# Patient Record
Sex: FEMALE | Race: WHITE | HISPANIC OR LATINO | URBAN - METROPOLITAN AREA
[De-identification: names, ages, dates, MRNs, and addresses within clinical notes are randomized per-mention and may not be internally consistent; named-entity substitution may affect disease eponyms.]

---

## 2021-10-09 ENCOUNTER — EMERGENCY (EMERGENCY)
Facility: HOSPITAL | Age: 71
LOS: 0 days | Discharge: HOME | End: 2021-10-10
Attending: STUDENT IN AN ORGANIZED HEALTH CARE EDUCATION/TRAINING PROGRAM | Admitting: STUDENT IN AN ORGANIZED HEALTH CARE EDUCATION/TRAINING PROGRAM
Payer: MEDICARE

## 2021-10-09 VITALS
OXYGEN SATURATION: 99 % | HEART RATE: 104 BPM | SYSTOLIC BLOOD PRESSURE: 171 MMHG | DIASTOLIC BLOOD PRESSURE: 82 MMHG | RESPIRATION RATE: 17 BRPM | TEMPERATURE: 99 F

## 2021-10-09 DIAGNOSIS — I10 ESSENTIAL (PRIMARY) HYPERTENSION: ICD-10-CM

## 2021-10-09 DIAGNOSIS — Z87.442 PERSONAL HISTORY OF URINARY CALCULI: ICD-10-CM

## 2021-10-09 DIAGNOSIS — R31.9 HEMATURIA, UNSPECIFIED: ICD-10-CM

## 2021-10-09 DIAGNOSIS — R10.30 LOWER ABDOMINAL PAIN, UNSPECIFIED: ICD-10-CM

## 2021-10-09 DIAGNOSIS — F17.200 NICOTINE DEPENDENCE, UNSPECIFIED, UNCOMPLICATED: ICD-10-CM

## 2021-10-09 LAB
ALBUMIN SERPL ELPH-MCNC: 4.5 G/DL — SIGNIFICANT CHANGE UP (ref 3.5–5.2)
ALP SERPL-CCNC: 106 U/L — SIGNIFICANT CHANGE UP (ref 30–115)
ALT FLD-CCNC: 18 U/L — SIGNIFICANT CHANGE UP (ref 0–41)
ANION GAP SERPL CALC-SCNC: 11 MMOL/L — SIGNIFICANT CHANGE UP (ref 7–14)
APPEARANCE UR: ABNORMAL
AST SERPL-CCNC: 21 U/L — SIGNIFICANT CHANGE UP (ref 0–41)
BACTERIA # UR AUTO: NEGATIVE — SIGNIFICANT CHANGE UP
BASOPHILS # BLD AUTO: 0.03 K/UL — SIGNIFICANT CHANGE UP (ref 0–0.2)
BASOPHILS NFR BLD AUTO: 0.2 % — SIGNIFICANT CHANGE UP (ref 0–1)
BILIRUB SERPL-MCNC: <0.2 MG/DL — SIGNIFICANT CHANGE UP (ref 0.2–1.2)
BILIRUB UR-MCNC: NEGATIVE — SIGNIFICANT CHANGE UP
BUN SERPL-MCNC: 13 MG/DL — SIGNIFICANT CHANGE UP (ref 10–20)
CALCIUM SERPL-MCNC: 9.7 MG/DL — SIGNIFICANT CHANGE UP (ref 8.5–10.1)
CHLORIDE SERPL-SCNC: 102 MMOL/L — SIGNIFICANT CHANGE UP (ref 98–110)
CO2 SERPL-SCNC: 28 MMOL/L — SIGNIFICANT CHANGE UP (ref 17–32)
COLOR SPEC: ABNORMAL
CREAT SERPL-MCNC: 0.6 MG/DL — LOW (ref 0.7–1.5)
DIFF PNL FLD: ABNORMAL
EOSINOPHIL # BLD AUTO: 0.27 K/UL — SIGNIFICANT CHANGE UP (ref 0–0.7)
EOSINOPHIL NFR BLD AUTO: 2.1 % — SIGNIFICANT CHANGE UP (ref 0–8)
EPI CELLS # UR: 4 /HPF — SIGNIFICANT CHANGE UP (ref 0–5)
GLUCOSE SERPL-MCNC: 115 MG/DL — HIGH (ref 70–99)
GLUCOSE UR QL: NEGATIVE — SIGNIFICANT CHANGE UP
HCT VFR BLD CALC: 42.3 % — SIGNIFICANT CHANGE UP (ref 37–47)
HGB BLD-MCNC: 14.3 G/DL — SIGNIFICANT CHANGE UP (ref 12–16)
HYALINE CASTS # UR AUTO: 1 /LPF — SIGNIFICANT CHANGE UP (ref 0–7)
IMM GRANULOCYTES NFR BLD AUTO: 0.5 % — HIGH (ref 0.1–0.3)
KETONES UR-MCNC: NEGATIVE — SIGNIFICANT CHANGE UP
LEUKOCYTE ESTERASE UR-ACNC: NEGATIVE — SIGNIFICANT CHANGE UP
LYMPHOCYTES # BLD AUTO: 3.94 K/UL — HIGH (ref 1.2–3.4)
LYMPHOCYTES # BLD AUTO: 30.8 % — SIGNIFICANT CHANGE UP (ref 20.5–51.1)
MCHC RBC-ENTMCNC: 30.2 PG — SIGNIFICANT CHANGE UP (ref 27–31)
MCHC RBC-ENTMCNC: 33.8 G/DL — SIGNIFICANT CHANGE UP (ref 32–37)
MCV RBC AUTO: 89.4 FL — SIGNIFICANT CHANGE UP (ref 81–99)
MONOCYTES # BLD AUTO: 0.91 K/UL — HIGH (ref 0.1–0.6)
MONOCYTES NFR BLD AUTO: 7.1 % — SIGNIFICANT CHANGE UP (ref 1.7–9.3)
NEUTROPHILS # BLD AUTO: 7.59 K/UL — HIGH (ref 1.4–6.5)
NEUTROPHILS NFR BLD AUTO: 59.3 % — SIGNIFICANT CHANGE UP (ref 42.2–75.2)
NITRITE UR-MCNC: NEGATIVE — SIGNIFICANT CHANGE UP
NRBC # BLD: 0 /100 WBCS — SIGNIFICANT CHANGE UP (ref 0–0)
PH UR: 7 — SIGNIFICANT CHANGE UP (ref 5–8)
PLATELET # BLD AUTO: 236 K/UL — SIGNIFICANT CHANGE UP (ref 130–400)
POTASSIUM SERPL-MCNC: 4.7 MMOL/L — SIGNIFICANT CHANGE UP (ref 3.5–5)
POTASSIUM SERPL-SCNC: 4.7 MMOL/L — SIGNIFICANT CHANGE UP (ref 3.5–5)
PROT SERPL-MCNC: 7 G/DL — SIGNIFICANT CHANGE UP (ref 6–8)
PROT UR-MCNC: ABNORMAL
RBC # BLD: 4.73 M/UL — SIGNIFICANT CHANGE UP (ref 4.2–5.4)
RBC # FLD: 12.8 % — SIGNIFICANT CHANGE UP (ref 11.5–14.5)
RBC CASTS # UR COMP ASSIST: >720 /HPF — HIGH (ref 0–4)
SODIUM SERPL-SCNC: 141 MMOL/L — SIGNIFICANT CHANGE UP (ref 135–146)
SP GR SPEC: 1.01 — SIGNIFICANT CHANGE UP (ref 1.01–1.03)
UROBILINOGEN FLD QL: SIGNIFICANT CHANGE UP
WBC # BLD: 12.8 K/UL — HIGH (ref 4.8–10.8)
WBC # FLD AUTO: 12.8 K/UL — HIGH (ref 4.8–10.8)
WBC UR QL: 13 /HPF — HIGH (ref 0–5)

## 2021-10-09 PROCEDURE — 99285 EMERGENCY DEPT VISIT HI MDM: CPT

## 2021-10-09 PROCEDURE — 74177 CT ABD & PELVIS W/CONTRAST: CPT | Mod: 26,MA

## 2021-10-09 PROCEDURE — 99283 EMERGENCY DEPT VISIT LOW MDM: CPT

## 2021-10-09 RX ORDER — SODIUM CHLORIDE 9 MG/ML
1000 INJECTION, SOLUTION INTRAVENOUS ONCE
Refills: 0 | Status: COMPLETED | OUTPATIENT
Start: 2021-10-09 | End: 2021-10-09

## 2021-10-09 RX ADMIN — SODIUM CHLORIDE 1000 MILLILITER(S): 9 INJECTION, SOLUTION INTRAVENOUS at 17:36

## 2021-10-09 NOTE — ED PROVIDER NOTE - PATIENT PORTAL LINK FT
You can access the FollowMyHealth Patient Portal offered by [facility] by registering at the following website: [email or web address]. By joining [employer]’s FollowMyHealth portal, you will also be able to view your health information using other applications (apps) compatible with our system.

## 2021-10-09 NOTE — ED PROVIDER NOTE - PHYSICAL EXAMINATION
CONSTITUTIONAL: Well-appearing; well-nourished; in no apparent distress.   CARDIOVASCULAR: Normal S1, S2; no murmurs, rubs, or gallops.   RESPIRATORY: Normal chest excursion with respiration; breath sounds clear and equal bilaterally; no wheezes, rhonchi, or rales.  GI/: non-distended; non-tender; no palpable organomegaly.   MS: +left cva ttp; Normal ROM in all four extremities; non-tender to palpation; distal pulses are normal.   SKIN: Normal for age and race; warm; dry; good turgor; no apparent lesions or exudate.   NEURO/PSYCH: A & O x 4; grossly unremarkable. mood and manner are appropriate.

## 2021-10-09 NOTE — ED PROVIDER NOTE - CARE PROVIDERS DIRECT ADDRESSES
[email or web address]

## 2021-10-09 NOTE — ED ADULT NURSE REASSESSMENT NOTE - NS ED NURSE REASSESS COMMENT FT1
Pt reassessed A/O times 4 Vs stable on cardiac monitor denies no abdomen pain denies N/V no dizziness rectal temperature done 98.8 rectal ,CT of the abdomen order is done completed , Ed attending aware of pt status ,on going nursing observation .

## 2021-10-09 NOTE — ED PROVIDER NOTE - CARE PROVIDER_API CALL
Christ Morrissey)  Urology  [address] [place] [address], [address] [place] [address]  Phone: [phone]  Fax: [phone]  Follow Up Time:     Chaz Wick)  Urology  [address] [place] [address], [place] [address]  Phone: [phone]  Fax: [phone]  Follow Up Time:

## 2021-10-09 NOTE — ED PROVIDER NOTE - PROGRESS NOTE DETAILS
per urology, f/u outpt- needs cysto. d/w pt and daughter all results, importance of f/u especially given pts smoking hx

## 2021-10-09 NOTE — ED PROVIDER NOTE - NS ED ROS FT
Constitutional: no fever, chills, no recent weight loss, change in appetite or malaise  Eyes: no redness/discharge/pain/vision changes  ENT: no rhinorrhea/ear pain/sore throat  Cardiac: No chest pain, SOB or edema.  Respiratory: No cough or respiratory distress  GI: No nausea, vomiting, diarrhea   MS: no pain to back or extremities, no loss of ROM, no weakness  Neuro: No headache or weakness. No LOC.  Skin: No skin rash.  Endocrine: No history of thyroid disease or diabetes.  Except as documented in the HPI, all other systems are negative.

## 2021-10-09 NOTE — CONSULT NOTE ADULT - SUBJECTIVE AND OBJECTIVE BOX
Pt c/o Left sided flank pain      PAST MEDICAL & SURGICAL HISTORY:      REVIEW OF SYSTEMS:    CONSTITUTIONAL:  fevers or chills  HEENT: No visual changes  ENDO: No sweating  NECK: No pain or stiffness  MUSCULOSKELETAL: No back pain, no joint pain  RESPIRATORY: No shortness of breath  CARDIOVASCULAR: No chest pain  GASTROINTESTINAL: No abdominal or epigastric pain. No nausea, vomiting,  No diarrhea or constipation.   NEUROLOGICAL: No mental status changes  PSYCH: No depression, no mood changes  SKIN: No itching      MEDICATIONS  (STANDING):    MEDICATIONS  (PRN):      Allergies    No Known Allergies    Intolerances        SOCIAL HISTORY: No illicit drug use    FAMILY HISTORY:      Vital Signs Last 24 Hrs  T(C): 36.7 (09 Oct 2021 20:27), Max: 37.3 (09 Oct 2021 16:10)  T(F): 98.1 (09 Oct 2021 20:27), Max: 99.1 (09 Oct 2021 16:10)  HR: 79 (09 Oct 2021 20:27) (79 - 104)  BP: 131/77 (09 Oct 2021 20:27) (131/77 - 171/82)  BP(mean): --  RR: 18 (09 Oct 2021 20:27) (17 - 18)  SpO2: 98% (09 Oct 2021 20:27) (98% - 99%)    PHYSICAL EXAM:    Constitutional: NAD, well-developed  HEENT: EOMI  Neck: no pain  Back: No CVA tenderness  Respiratory: No accessory respiratory muscle use  Abd: Soft, NT/ND  no organomegally  no hernia  :   KENN:   Extremities: no edema  Neurological: A/O x 3  Psychiatric: Normal mood, normal affect  Skin: No rashes    I&O's Summary      LABS:                        14.3   12.80 )-----------( 236      ( 09 Oct 2021 17:37 )             42.3     10    141  |  102  |  13  ----------------------------<  115<H>  4.7   |  28  |  0.6<L>    Ca    9.7      09 Oct 2021 17:37    TPro  7.0  /  Alb  4.5  /  TBili  <0.2  /  DBili  x   /  AST  21  /  ALT  18  /  AlkPhos  106  10-09      Urinalysis Basic - ( 09 Oct 2021 17:37 )    Color: Light Orange / Appearance: Turbid / S.014 / pH: x  Gluc: x / Ketone: Negative  / Bili: Negative / Urobili: <2 mg/dL   Blood: x / Protein: 30 mg/dL / Nitrite: Negative   Leuk Esterase: Negative / RBC: >720 /HPF / WBC 13 /HPF   Sq Epi: x / Non Sq Epi: 4 /HPF / Bacteria: Negative          RADIOLOGY & ADDITIONAL STUDIES:  < from: CT Abdomen and Pelvis w/ IV Cont (10.09.21 @ 20:32) >    EXAM:  CT ABDOMEN AND PELVIS IC            PROCEDURE DATE:  10/09/2021            INTERPRETATION:  CLINICAL STATEMENT: Left flank pain    TECHNIQUE: Contiguous axial CT images were obtained from the lower chest to the pubic symphysis .  Oral contrast not given.  The 95 cc of Omnipaque 350 intravenous contrast was given and 5 cc of contrast was discarded Reformatted images in the coronal and sagittal planes were acquired.    COMPARISON CT: None.    OTHER STUDIES USED FOR CORRELATION: None.      FINDINGS:  The liver spleen pancreas and adrenal glands appear normal.  There is symmetrical enhancement of the kidneys.  The right kidney appears normal.  The left kidney shows multiple cysts  both cortical and parapelvic. There is no definite evidence of hydronephrosis however a delayed KUB of the abdomen is suggested to confirm this impression. No stone or mass is seen.  There is no ascites.  There is no mesenteric retroperitoneal or pelvic lymphadenopathy.  The bowel pattern except for a hiatus hernia appears normal. There is no free air. The SMV and SMA are patent.  No pelvic mass or inflammatory process is seen.  The bony structures appear intact.    IMPRESSION: Multiple left cortical and parapelvic cysts. No renal or ureteral stone is seen. Hydronephrosis difficult to evaluate in the left kidney and a delayed KUB is suggested for further evaluation. A call back request was submitted    --- End of Report ---              RAEGAN ALVARENGA MD; Attending Radiologist  This document has been electronically signed. Oct  9 2021  9:13PM    < end of copied text >

## 2021-10-09 NOTE — ED PROVIDER NOTE - OBJECTIVE STATEMENT
pt active smoker for many years with pmhx kidney stones, htn presents to ED c/o hematuria and lower abd pain, left flank pain for 1 week. 2 prior episodes this year, resolved on their own. pain is sharp, nonradiating, moderate. denies exacerbating or relieving factors. Denies fever/chill/HA/dizziness/chest pain/palpitation/sob/n/v/d/ black stool/bloody stool

## 2021-10-09 NOTE — CONSULT NOTE ADULT - PROBLEM SELECTOR RECOMMENDATION 9
Send urine for culture and cytology  F/U KUB xray for any delayed contrast emptying to suggest a ureteral obstruction.  If the KUB is normal pt may go home on PO abx and f/u with Urology as outpt Send urine for culture and cytology  F/U KUB xray for any delayed contrast emptying to suggest a ureteral obstruction.  If the KUB is normal pt may go home on PO abx and f/u with Urology as outpt. Will need Hematuria w/u including OP Cystoscopy

## 2021-10-09 NOTE — ED PROVIDER NOTE - ATTENDING CONTRIBUTION TO CARE
70 yo f, smoker, hx kidney stones, htn  pt presents to er for eval of hematuria, lower abd pain and L flank pain. sx ongoing for 1 week. pt had similar episode before this year which improved spontaneously. pain sharp, no radiation. no f/c/n/v/d/black stool    vss  gen- NAD, aaox3  card-rrr  lungs-ctab, no wheezing or rhonchi  abd-sntnd, no guarding or rebound, mild L CVAT  neuro- full str/sensation, cn ii-xii grossly intact, normal coordination and gait    plan for belly labs, ua, ctap  r/o kidney stone, pyelo

## 2021-10-09 NOTE — ED ADULT NURSE NOTE - OBJECTIVE STATEMENT
Pt with C/O hematuria frequent urinating burning pain lower abdomen pain worse for 3 days .Pt denies fever or chills no N/V ,denies diarrhea or constipation

## 2021-10-10 VITALS
TEMPERATURE: 99 F | RESPIRATION RATE: 17 BRPM | DIASTOLIC BLOOD PRESSURE: 79 MMHG | SYSTOLIC BLOOD PRESSURE: 126 MMHG | OXYGEN SATURATION: 98 % | HEART RATE: 79 BPM

## 2021-10-10 PROCEDURE — 74018 RADEX ABDOMEN 1 VIEW: CPT | Mod: 26

## 2021-10-11 PROBLEM — Z00.00 ENCOUNTER FOR PREVENTIVE HEALTH EXAMINATION: Status: ACTIVE | Noted: 2021-10-11

## 2021-10-12 LAB
CULTURE RESULTS: SIGNIFICANT CHANGE UP
SPECIMEN SOURCE: SIGNIFICANT CHANGE UP

## 2021-10-15 ENCOUNTER — EMERGENCY (EMERGENCY)
Facility: HOSPITAL | Age: 71
LOS: 0 days | Discharge: HOME | End: 2021-10-16
Attending: EMERGENCY MEDICINE | Admitting: EMERGENCY MEDICINE
Payer: MEDICARE

## 2021-10-15 VITALS
HEART RATE: 91 BPM | RESPIRATION RATE: 20 BRPM | OXYGEN SATURATION: 96 % | DIASTOLIC BLOOD PRESSURE: 90 MMHG | TEMPERATURE: 98 F | SYSTOLIC BLOOD PRESSURE: 210 MMHG

## 2021-10-15 DIAGNOSIS — R11.0 NAUSEA: ICD-10-CM

## 2021-10-15 DIAGNOSIS — R10.9 UNSPECIFIED ABDOMINAL PAIN: ICD-10-CM

## 2021-10-15 DIAGNOSIS — F17.210 NICOTINE DEPENDENCE, CIGARETTES, UNCOMPLICATED: ICD-10-CM

## 2021-10-15 DIAGNOSIS — R10.31 RIGHT LOWER QUADRANT PAIN: ICD-10-CM

## 2021-10-15 DIAGNOSIS — R10.32 LEFT LOWER QUADRANT PAIN: ICD-10-CM

## 2021-10-15 DIAGNOSIS — Z90.49 ACQUIRED ABSENCE OF OTHER SPECIFIED PARTS OF DIGESTIVE TRACT: Chronic | ICD-10-CM

## 2021-10-15 DIAGNOSIS — Z87.19 PERSONAL HISTORY OF OTHER DISEASES OF THE DIGESTIVE SYSTEM: ICD-10-CM

## 2021-10-15 DIAGNOSIS — I10 ESSENTIAL (PRIMARY) HYPERTENSION: ICD-10-CM

## 2021-10-15 DIAGNOSIS — Z87.442 PERSONAL HISTORY OF URINARY CALCULI: ICD-10-CM

## 2021-10-15 DIAGNOSIS — Z90.49 ACQUIRED ABSENCE OF OTHER SPECIFIED PARTS OF DIGESTIVE TRACT: ICD-10-CM

## 2021-10-15 DIAGNOSIS — N32.9 BLADDER DISORDER, UNSPECIFIED: ICD-10-CM

## 2021-10-15 LAB
ALBUMIN SERPL ELPH-MCNC: 4.5 G/DL — SIGNIFICANT CHANGE UP (ref 3.5–5.2)
ALP SERPL-CCNC: 95 U/L — SIGNIFICANT CHANGE UP (ref 30–115)
ALT FLD-CCNC: 19 U/L — SIGNIFICANT CHANGE UP (ref 0–41)
ANION GAP SERPL CALC-SCNC: 13 MMOL/L — SIGNIFICANT CHANGE UP (ref 7–14)
APPEARANCE UR: CLEAR — SIGNIFICANT CHANGE UP
AST SERPL-CCNC: 29 U/L — SIGNIFICANT CHANGE UP (ref 0–41)
BACTERIA # UR AUTO: NEGATIVE — SIGNIFICANT CHANGE UP
BASOPHILS # BLD AUTO: 0.03 K/UL — SIGNIFICANT CHANGE UP (ref 0–0.2)
BASOPHILS NFR BLD AUTO: 0.3 % — SIGNIFICANT CHANGE UP (ref 0–1)
BILIRUB SERPL-MCNC: 0.3 MG/DL — SIGNIFICANT CHANGE UP (ref 0.2–1.2)
BILIRUB UR-MCNC: NEGATIVE — SIGNIFICANT CHANGE UP
BUN SERPL-MCNC: 15 MG/DL — SIGNIFICANT CHANGE UP (ref 10–20)
CALCIUM SERPL-MCNC: 9.7 MG/DL — SIGNIFICANT CHANGE UP (ref 8.5–10.1)
CHLORIDE SERPL-SCNC: 102 MMOL/L — SIGNIFICANT CHANGE UP (ref 98–110)
CO2 SERPL-SCNC: 24 MMOL/L — SIGNIFICANT CHANGE UP (ref 17–32)
COLOR SPEC: SIGNIFICANT CHANGE UP
CREAT SERPL-MCNC: 0.6 MG/DL — LOW (ref 0.7–1.5)
CULTURE RESULTS: SIGNIFICANT CHANGE UP
DIFF PNL FLD: ABNORMAL
EOSINOPHIL # BLD AUTO: 0.21 K/UL — SIGNIFICANT CHANGE UP (ref 0–0.7)
EOSINOPHIL NFR BLD AUTO: 2.1 % — SIGNIFICANT CHANGE UP (ref 0–8)
EPI CELLS # UR: 3 /HPF — SIGNIFICANT CHANGE UP (ref 0–5)
GLUCOSE SERPL-MCNC: 110 MG/DL — HIGH (ref 70–99)
GLUCOSE UR QL: NEGATIVE — SIGNIFICANT CHANGE UP
HCT VFR BLD CALC: 40.2 % — SIGNIFICANT CHANGE UP (ref 37–47)
HGB BLD-MCNC: 13.5 G/DL — SIGNIFICANT CHANGE UP (ref 12–16)
HYALINE CASTS # UR AUTO: 0 /LPF — SIGNIFICANT CHANGE UP (ref 0–7)
IMM GRANULOCYTES NFR BLD AUTO: 0.3 % — SIGNIFICANT CHANGE UP (ref 0.1–0.3)
KETONES UR-MCNC: NEGATIVE — SIGNIFICANT CHANGE UP
LACTATE SERPL-SCNC: 0.8 MMOL/L — SIGNIFICANT CHANGE UP (ref 0.7–2)
LEUKOCYTE ESTERASE UR-ACNC: NEGATIVE — SIGNIFICANT CHANGE UP
LIDOCAIN IGE QN: 73 U/L — HIGH (ref 7–60)
LYMPHOCYTES # BLD AUTO: 29.7 % — SIGNIFICANT CHANGE UP (ref 20.5–51.1)
LYMPHOCYTES # BLD AUTO: 3.04 K/UL — SIGNIFICANT CHANGE UP (ref 1.2–3.4)
MCHC RBC-ENTMCNC: 29.5 PG — SIGNIFICANT CHANGE UP (ref 27–31)
MCHC RBC-ENTMCNC: 33.6 G/DL — SIGNIFICANT CHANGE UP (ref 32–37)
MCV RBC AUTO: 88 FL — SIGNIFICANT CHANGE UP (ref 81–99)
MONOCYTES # BLD AUTO: 0.58 K/UL — SIGNIFICANT CHANGE UP (ref 0.1–0.6)
MONOCYTES NFR BLD AUTO: 5.7 % — SIGNIFICANT CHANGE UP (ref 1.7–9.3)
NEUTROPHILS # BLD AUTO: 6.33 K/UL — SIGNIFICANT CHANGE UP (ref 1.4–6.5)
NEUTROPHILS NFR BLD AUTO: 61.9 % — SIGNIFICANT CHANGE UP (ref 42.2–75.2)
NITRITE UR-MCNC: NEGATIVE — SIGNIFICANT CHANGE UP
NRBC # BLD: 0 /100 WBCS — SIGNIFICANT CHANGE UP (ref 0–0)
PH UR: 7 — SIGNIFICANT CHANGE UP (ref 5–8)
PLATELET # BLD AUTO: 233 K/UL — SIGNIFICANT CHANGE UP (ref 130–400)
POTASSIUM SERPL-MCNC: 4.6 MMOL/L — SIGNIFICANT CHANGE UP (ref 3.5–5)
POTASSIUM SERPL-SCNC: 4.6 MMOL/L — SIGNIFICANT CHANGE UP (ref 3.5–5)
PROT SERPL-MCNC: 7.1 G/DL — SIGNIFICANT CHANGE UP (ref 6–8)
PROT UR-MCNC: NEGATIVE — SIGNIFICANT CHANGE UP
RBC # BLD: 4.57 M/UL — SIGNIFICANT CHANGE UP (ref 4.2–5.4)
RBC # FLD: 12.7 % — SIGNIFICANT CHANGE UP (ref 11.5–14.5)
RBC CASTS # UR COMP ASSIST: 0 /HPF — SIGNIFICANT CHANGE UP (ref 0–4)
SODIUM SERPL-SCNC: 139 MMOL/L — SIGNIFICANT CHANGE UP (ref 135–146)
SP GR SPEC: 1.01 — SIGNIFICANT CHANGE UP (ref 1.01–1.03)
SPECIMEN SOURCE: SIGNIFICANT CHANGE UP
UROBILINOGEN FLD QL: SIGNIFICANT CHANGE UP
WBC # BLD: 10.22 K/UL — SIGNIFICANT CHANGE UP (ref 4.8–10.8)
WBC # FLD AUTO: 10.22 K/UL — SIGNIFICANT CHANGE UP (ref 4.8–10.8)
WBC UR QL: 3 /HPF — SIGNIFICANT CHANGE UP (ref 0–5)

## 2021-10-15 PROCEDURE — 99282 EMERGENCY DEPT VISIT SF MDM: CPT

## 2021-10-15 PROCEDURE — 74177 CT ABD & PELVIS W/CONTRAST: CPT | Mod: 26,MA

## 2021-10-15 PROCEDURE — 99285 EMERGENCY DEPT VISIT HI MDM: CPT

## 2021-10-15 PROCEDURE — 93010 ELECTROCARDIOGRAM REPORT: CPT

## 2021-10-15 RX ORDER — SODIUM CHLORIDE 9 MG/ML
1000 INJECTION INTRAMUSCULAR; INTRAVENOUS; SUBCUTANEOUS ONCE
Refills: 0 | Status: COMPLETED | OUTPATIENT
Start: 2021-10-15 | End: 2021-10-15

## 2021-10-15 RX ORDER — ONDANSETRON 8 MG/1
4 TABLET, FILM COATED ORAL ONCE
Refills: 0 | Status: COMPLETED | OUTPATIENT
Start: 2021-10-15 | End: 2021-10-15

## 2021-10-15 RX ORDER — MORPHINE SULFATE 50 MG/1
4 CAPSULE, EXTENDED RELEASE ORAL ONCE
Refills: 0 | Status: DISCONTINUED | OUTPATIENT
Start: 2021-10-15 | End: 2021-10-15

## 2021-10-15 RX ORDER — KETOROLAC TROMETHAMINE 30 MG/ML
15 SYRINGE (ML) INJECTION ONCE
Refills: 0 | Status: DISCONTINUED | OUTPATIENT
Start: 2021-10-15 | End: 2021-10-15

## 2021-10-15 RX ADMIN — ONDANSETRON 4 MILLIGRAM(S): 8 TABLET, FILM COATED ORAL at 17:38

## 2021-10-15 RX ADMIN — MORPHINE SULFATE 4 MILLIGRAM(S): 50 CAPSULE, EXTENDED RELEASE ORAL at 17:38

## 2021-10-15 RX ADMIN — SODIUM CHLORIDE 1000 MILLILITER(S): 9 INJECTION INTRAMUSCULAR; INTRAVENOUS; SUBCUTANEOUS at 17:38

## 2021-10-15 NOTE — ED PROVIDER NOTE - OBJECTIVE STATEMENT
70 yo F pmhx kidney stones, HTN, diverticulitis s/p surgery, appendectomy presenting to the ED for evaluation of abdominal pain associated with nausea x few days, pt was seen and evaluated in the ED at that time for hematuria and flank pain which has resolved at this time, CT and KUB performed revealed L sided hydronephrosis, urology cleared pt and recommended outpt follow up, pt has not followed up yet. Pt reports today pain is different, b/l lower abdomen, cramping, constant, non radiating, no alleviating or provoking factors. last BM 12pm today. Denies fever, chills, diarrhea, dysuria, hematuria, flank pain, sob, chest pain. 72 yo F pmhx kidney stones, HTN, diverticulitis s/p surgery, appendectomy, current heavy smoker, presenting to the ED for evaluation of abdominal pain associated with nausea x few days, pt was seen and evaluated in the ED at that time for hematuria and flank pain which has resolved at this time, CT and KUB performed revealed L sided hydronephrosis, urology cleared pt and recommended outpt follow up, pt has not followed up yet. Pt reports today pain is different, b/l lower abdomen, cramping, constant, non radiating, no alleviating or provoking factors. last BM 12pm today. Denies fever, chills, diarrhea, dysuria, hematuria, flank pain, sob, chest pain.

## 2021-10-15 NOTE — ED ADULT TRIAGE NOTE - CHIEF COMPLAINT QUOTE
c/o abdominal pain with nausea x few days, patient seen recently in ED and told to follow up with urologist.

## 2021-10-15 NOTE — ED PROVIDER NOTE - ATTENDING CONTRIBUTION TO CARE
70 yo F with PMHx of HTN, kidney stones, diverticulitis s/p surgery, appendectomy who presents with mod/severe diffuse abd pain x1 day associated with nausea. Not improved with tylenol. Seen in ED 6 days ago for flank pain and hematuria which have since resolved. No fever, vomiting, dysuria, hematuria, hematochezia, melena. Last BM at 12pm today, normal.    CONSTITUTIONAL: well developed, nontoxic appearing, in no acute distress but appears uncomfortable 2/2 pain, speaking in full sentences  SKIN: warm, dry, no rash, cap refill < 2 seconds  HEENT: normocephalic, atraumatic, no conjunctival erythema, moist mucous membranes, patent airway  NECK: supple  CV:  regular rate, regular rhythm, 2+ radial pulses bilaterally  RESP: no wheezes, no rales, no rhonchi, normal work of breathing  ABD: soft, diffusely tender, nondistended, no rebound, no guarding  BACK: no CVA tenderness  MSK: normal ROM, no cyanosis, no edema  NEURO: alert, oriented, grossly unremarkable  PSYCH: cooperative, appropriate    Mildly hypertensive in ED likely 2/2 pain, rest of vitals wnl. No peritonitic signs. Plan for labs, urine, ekg, CT abd, symptomatic care, reassess.

## 2021-10-15 NOTE — ED PROVIDER NOTE - CARE PROVIDER_API CALL
Sherif Jacques)  Urology  [address] [place] [address]  Phone: [phone]  Fax: [phone]  Follow Up Time: 1-3 Days

## 2021-10-15 NOTE — ED ADULT NURSE NOTE - NSIMPLEMENTINTERV_GEN_ALL_ED
Implemented All Universal Safety Interventions:  [place] to call system. Call bell, personal items and telephone within reach. Instruct patient to call for assistance. Room bathroom lighting operational. Non-slip footwear when patient is off stretcher. Physically safe environment: no spills, clutter or unnecessary equipment. Stretcher in lowest position, wheels locked, appropriate side rails in place.

## 2021-10-15 NOTE — ED PROVIDER NOTE - PROGRESS NOTE DETAILS
TC: Labs wnl. Ua negative. Signed out to Dr. Huston pending CT results. TC: BP improved. Labs wnl. Ua negative. Signed out to Dr. Huston pending CT results. NC: Spoke to Urology GRAYSON Lehman regarding CT results, recommend outpatient cystoscopy at her upcoming appointment, cleared for discharge. pt aware of 1.2cm lesion in bladder.

## 2021-10-15 NOTE — ED PROVIDER NOTE - NS ED ROS FT
Constitutional: (-) fever (-) malaise (-) diaphoresis (-) chills (-) wt. loss (-) bodyaches (-) night sweats  Eyes: (-) visual changes (-) eye pain (-) eye discharge (-) photophobia (-) FB sensation  ENMT: (-) nasal or chest congestion (-) runny nose (-) sore throat (-) hoarseness  (-) hearing changes (-) ear pain (-) ear discharge or infections (-) neck pain (-) neck stiffness  Cardiac: (-) chest pain  (-) palpitations (-) syncope (-) edema  Respiratory: (-) cough (-) SOB (-) ANDRADE  GI: (+) nausea (-) vomiting (-) diarrhea (+) abdominal pain   : (-) dysuria (-) increased frequency  (-) hematuria (-) incontinence  MS: (-) back pain (-) myalgia (-) muscle weakness (-)  joint pain  Neuro: (-) headache (-) dizziness (-) numbness/tingling to extremities B/L (-) weakness   Skin: (-) rash (-) laceration    Except as documented in the HPI, all other systems are negative.

## 2021-10-15 NOTE — ED PROVIDER NOTE - CLINICAL SUMMARY MEDICAL DECISION MAKING FREE TEXT BOX
pt evaluated for abdominal discomfort, labs unremarkable, slight elevated lipase, CT A/P with soft issue density seen posterior wall bladder. Urology evaluated pt, has close follow up scheduled and cystoscopy planned. All results d/w pt and daughter at bedside who pt preferred to translate. All questions addresses. advised repeat lipase outpt. Pt agrees to follow up as discussed. Strict return precautions advised and pt verbalized understanding.

## 2021-10-15 NOTE — ED PROVIDER NOTE - PHYSICAL EXAMINATION
GENERAL: Well-nourished, Well-developed. NAD.  HEAD: No visible or palpable bumps or hematomas. No ecchymosis behind ears B/L.  Eyes: PERRLA, EOMI. No asymmetry. No nystagmus. No conjunctival injection. Non-icteric sclera.  ENMT: MMM.   Neck: Supple. FROM  CVS: Normal S1,S2. No murmurs appreciated on auscultation   RESP: No use of accessory muscles. Chest rise symmetrical with good expansion. Lungs clear to auscultation B/L. No wheezing, rales, or rhonchi auscultated.  GI: Normal auscultation of bowel sounds in all 4 quadrants. (+)mild ttp B/L lower abdomen. Soft, Nondistended. No guarding or rebound tenderness. No CVAT B/L.  Skin: Warm, Dry. No rashes or lesions. Good cap refill < 2 sec B/L.  EXT: Radial and pedal pulses present B/L. No calf tenderness or swelling B/L. No palpable cords. No pedal edema B/L.  Neuro: AA&O x 3. Sensation grossly intact. Strength 5/5 B/L. Gait within normal limits

## 2021-10-15 NOTE — ED PROVIDER NOTE - NSFOLLOWUPINSTRUCTIONS_ED_ALL_ED_FT
**FOLLOW UP WITH UROLOGIST WITHIN 1-3 DAYS*** **FOLLOW UP WITH UROLOGIST AT YOUR APPOINTMENT ON 10/22** **FOLLOW UP WITH UROLOGIST AT YOUR APPOINTMENT ON 10/22**    FOLLOW UP WITH YOUR DOCTOR THIS WEEK FOR REEVALUATION.      RETURN TO ED IMMEDIATELY WITH ANY WORSENING SYMPTOMS, CHEST PAIN, SHORTNESS OF BREATH, FEVERS, WEAKNESS, DIZZINESS, PERSISTENT OR SEVERE HEADACHE OR ANY OTHER CONCERNS.

## 2021-10-16 VITALS
RESPIRATION RATE: 20 BRPM | TEMPERATURE: 98 F | OXYGEN SATURATION: 98 % | HEART RATE: 73 BPM | SYSTOLIC BLOOD PRESSURE: 133 MMHG | DIASTOLIC BLOOD PRESSURE: 65 MMHG

## 2021-10-16 NOTE — CONSULT NOTE ADULT - ASSESSMENT
71 year old female presenting to the ED for evaluation of abdominal pain associated with nausea x few days, last seen in the ED on 10/09/21 for hematuria and flank pain which has resolved - now found to have a 1.2cm bladder lesion. found on CTAP.  called for further evaluation.    Case and imaging discussed with Dr. Lobo, plan is as follows:  ·	No acute /surgical intervention indicated at this time  ·	Patient not hematuric on this encounter - still recommend OP f/u to complete hematuria workup  ·	Recommend OP f/u w/  for cystoscopy with possible biopsy, urine cytology and further urologic work up  ·	Cr stable, no evidence of LEAH  ·	Stable parapelvic renal cysts on CT  ·	Patient voiding freely without any issues

## 2021-10-16 NOTE — CONSULT NOTE ADULT - SUBJECTIVE AND OBJECTIVE BOX
Urology Consult Note: 71 year old female pmhx of nephrolithiasis, HTN, diverticulitis s/p surgery, appendectomy, current heavy smoker, presenting to the ED for evaluation of abdominal pain associated with nausea x few days. Patient was last seen in the ED on 10/09/21 for hematuria and flank pain which has resolved. At that time, CT and KUB performed revealed L sided hydronephrosis, urology cleared pt and recommended outpt follow up. Patient was subsequently discharged in stable condition. Patient returns with daughter at bedside this evening complaining of abdominal pain.  called to evaluate with CTAP revealed a 1.2cm bladder lesion. Shellie seen and examined w/ daughter who provided translation at bedside in the ED - patient and daughter report that they have a f/u appointment w/  on 10/22/21. Patient and daughter today report today pain is slightly different in the lower abdomen, with cramping, that is constant, nonradiating, no alleviating or provoking factors. Patient reports that her BMs have been regular and baseline. Last BM 12pm today. Patient denies fever, chills, diarrhea, dysuria, hematuria, flank pain, sob, chest pain, and other LUTS.    PAST MEDICAL & SURGICAL HISTORY:  Hypertension  Nephrolithiasis  Diverticulitis  Appendectomy    [ x ] A 10 Point Review of Systems was negative except where noted    Allergies: No Known Allergies    SOCIAL HISTORY: No illicit drug use    Vital Signs Last 24 Hrs  T(C): 36.9 (16 Oct 2021 00:43), Max: 36.9 (16 Oct 2021 00:43)  T(F): 98.4 (16 Oct 2021 00:43), Max: 98.4 (16 Oct 2021 00:43)  HR: 73 (16 Oct 2021 00:43) (73 - 91)  BP: 133/65 (16 Oct 2021 00:43) (133/65 - 210/90)  RR: 20 (16 Oct 2021 00:43) (20 - 20)  SpO2: 98% (16 Oct 2021 00:43) (96% - 98%)    PHYSICAL EXAM:  Constitutional: NAD, well-developed, well  nourished   HEENT: NC/AT  Back: No Right CVA ttp, + mild L CVA ttp  Resp: No accessory respiratory muscle use  Abd: Soft, NT/ND.   Cardio: +S1/S2  :  No suprapubic ttp  Neuro: Awake and alert  Psych: Normal mood, normal affect  Skin: Good color, non diaphoretic    LABS:                      13.5   10.22 )-----------( 233      ( 15 Oct 2021 17:29 )             40.2     10-15  139  |  102  |  15  ----------------------------<  110<H>  4.6   |  24  |  0.6<L>    Ca    9.7      15 Oct 2021 18:12  TPro  7.1  /  Alb  4.5  /  TBili  0.3  /  DBili  x   /  AST  29  /  ALT  19  /  AlkPhos  95  10-15    Urinalysis Basic - ( 15 Oct 2021 17:29 )  Color: Light Yellow / Appearance: Clear / S.010 / pH: x  Gluc: x / Ketone: Negative  / Bili: Negative / Urobili: <2 mg/dL   Blood: x / Protein: Negative / Nitrite: Negative   Leuk Esterase: Negative / RBC: 0 /HPF / WBC 3 /HPF   Sq Epi: x / Non Sq Epi: 3 /HPF / Bacteria: Negative    RADIOLOGY & ADDITIONAL STUDIES:  < from: CT Abdomen and Pelvis w/ IV Cont (10.09.21 @ 20:32) >    EXAM:  CT ABDOMEN AND PELVIS IC            PROCEDURE DATE:  10/09/2021            INTERPRETATION:  CLINICAL STATEMENT: Left flank pain    TECHNIQUE: Contiguous axial CT images were obtained from the lower chest to the pubic symphysis .  Oral contrast not given.  The 95 cc of Omnipaque 350 intravenous contrast was given and 5 cc of contrast was discarded Reformatted images in the coronal and sagittal planes were acquired.    COMPARISON CT: None.    OTHER STUDIES USED FOR CORRELATION: None.      FINDINGS:  The liver spleen pancreas and adrenal glands appear normal.  There is symmetrical enhancement of the kidneys.  The right kidney appears normal.  The left kidney shows multiple cysts  both cortical and parapelvic. There is no definite evidence of hydronephrosis however a delayed KUB of the abdomen is suggested to confirm this impression. No stone or mass is seen.  There is no ascites.  There is no mesenteric retroperitoneal or pelvic lymphadenopathy.  The bowel pattern except for a hiatus hernia appears normal. There is no free air. The SMV and SMA are patent.  No pelvic mass or inflammatory process is seen.  The bony structures appear intact.    IMPRESSION: Multiple left cortical and parapelvic cysts. No renal or ureteral stone is seen. Hydronephrosis difficult to evaluate in the left kidney and a delayed KUB is suggested for further evaluation. A call back request was submitted    --- End of Report ---  RAEGAN ALVARENGA MD; Attending Radiologist  This document has been electronically signed. Oct  9 2021  9:13PM    < end of copied text >                    < from: Xray Kidney Ureter Bladder (10.10. @ 00:00) >    EXAM:  XR KUB 1 VIEW          PROCEDURE DATE:  10/10/2021    INTERPRETATION:  Clinical History / Reason for exam: Left parapelvic cyst, evaluate for hydronephrosis    Single supine abdominal x-ray is provided for review and compared to abdominal pelvic CT dated 10/9/2021.    Excreted contrast is noted in both collecting systems and the bladder. There appears to be dilatation of the left upper pole calyces. The osseous structures demonstrates degenerative changes. The bowel gas patternis nonobstructed.    IMPRESSION:    Dilatation of the left upper pole calyces.    --- End of Report ---  JUAN PABLO CHANG MD; Attending Radiologist  This document has been electronically signed. Oct 10 2021  6:53AM    < end of copied text >              EXAM:  CT ABDOMEN AND PELVIS IC          PROCEDURE DATE:  10/15/2021    INTERPRETATION:  CLINICAL HISTORY: Abdominal pain.    TECHNIQUE: Contiguous axial CT images were obtained from the lower chest to the pubic symphysis following administration of intravenous contrast. Oral contrast was not administered. Reformatted images in the coronal and sagittal planes were acquired.    COMPARISON: CT abdomen pelvis dated 10/9/2021.      FINDINGS:    LOWER CHEST: Moderate hiatal hernia.    HEPATOBILIARY: Cholecystectomy.    SPLEEN: Unremarkable.    PANCREAS: Unremarkable.    ADRENAL GLANDS: Unremarkable.    KIDNEYS: No hydronephrosis bilaterally. Symmetric nephrograms. Unchanged left parapelvic renal cysts.    ABDOMINOPELVIC NODES: No lymphadenopathy.    PELVIC ORGANS: 1.2 cm nodular right posterior bladder wall soft tissue lesion. Hysterectomy.    PERITONEUM/MESENTERY/BOWEL: No pneumoperitoneum or abdominopelvic free fluid. No evidence of bowel obstruction. The appendix is not definitely seen. However, there is no evidence of pericecal inflammation.    BONES/SOFT TISSUES: No acute osseous abnormality. Stable grade 1 anterolisthesis of L4 on L5.    OTHER: Calcific atherosclerosis.      IMPRESSION:  1.2 cm nodular right posteriorwall soft tissue lesion. Neoplasm is of primary consideration and further evaluation with cystoscopy is recommended.    Overall, no evidence of acute abdominal or pelvic pathology.    --- End of Report ---  CHACHO LOYA MD; Resident Radiologist  This document has been electronically signed.  LOTUS DANIELS MD; Attending Radiologist  This document has been electronically signed. Oct 16 2021  1:10AM

## 2021-10-18 LAB
CULTURE RESULTS: SIGNIFICANT CHANGE UP
SPECIMEN SOURCE: SIGNIFICANT CHANGE UP

## 2021-10-22 ENCOUNTER — APPOINTMENT (OUTPATIENT)
Dept: UROLOGY | Facility: CLINIC | Age: 71
End: 2021-10-22
Payer: MEDICARE

## 2021-10-22 DIAGNOSIS — R10.13 EPIGASTRIC PAIN: ICD-10-CM

## 2021-10-22 DIAGNOSIS — C67.2 MALIGNANT NEOPLASM OF LATERAL WALL OF BLADDER: ICD-10-CM

## 2021-10-22 DIAGNOSIS — K57.90 DIVERTICULOSIS OF INTESTINE, PART UNSPECIFIED, W/OUT PERFORATION OR ABSCESS W/OUT BLEEDING: ICD-10-CM

## 2021-10-22 DIAGNOSIS — R31.0 GROSS HEMATURIA: ICD-10-CM

## 2021-10-22 DIAGNOSIS — F17.210 NICOTINE DEPENDENCE, CIGARETTES, UNCOMPLICATED: ICD-10-CM

## 2021-10-22 PROCEDURE — 52000 CYSTOURETHROSCOPY: CPT

## 2021-10-22 PROCEDURE — 99204 OFFICE O/P NEW MOD 45 MIN: CPT | Mod: 25

## 2021-10-22 RX ORDER — PNV NO.95/FERROUS FUM/FOLIC AC 28MG-0.8MG
TABLET ORAL
Refills: 0 | Status: ACTIVE | COMMUNITY

## 2021-10-22 RX ORDER — SULFAMETHOXAZOLE AND TRIMETHOPRIM 800; 160 MG/1; MG/1
800-160 TABLET ORAL TWICE DAILY
Qty: 6 | Refills: 0 | Status: ACTIVE | COMMUNITY
Start: 2021-10-22 | End: 1900-01-01

## 2021-10-22 RX ORDER — LOSARTAN POTASSIUM 100 MG/1
TABLET, FILM COATED ORAL
Refills: 0 | Status: ACTIVE | COMMUNITY

## 2021-10-22 RX ORDER — SIMVASTATIN 80 MG/1
TABLET, FILM COATED ORAL
Refills: 0 | Status: ACTIVE | COMMUNITY

## 2021-10-22 NOTE — ASSESSMENT
[FreeTextEntry1] : Left sided and abdominal pain and gross hematuria. No definitive stones. Decision made to proceed with cysto- see separate procedure note but bladder tumors noted. Discussed with patient and daughter and will set up TURBT. Also discussed that etiology of pain is unclear at this point but likely not  related. All questions answered. Total time excluding separate cystoscopy 50 min.

## 2021-10-22 NOTE — HISTORY OF PRESENT ILLNESS
[Nocturia] : nocturia [Hematuria - Gross] : gross hematuria [Left Flank] : left flank [Left Lower Back] : left lower back [Upper Abdomen] : upper abdomen [FreeTextEntry1] : 71 year old Vietnamese speaking female had 2 days of painless gross hematuria. Then had left sided pain and also upper abdominal pain. No dysuria, frequency, urgency etc. Had CT X 2 with no definitive stones and a possible bladder mass noted on second CT. Otherwise feels well with good appetite. Hx diverticulosis and over 40 pack year smoking history.\par Denies incontinence or significant hx of UTIs. [Urinary Incontinence] : no urinary incontinence [Urinary Retention] : no urinary retention [Dysuria] : no dysuria

## 2021-10-22 NOTE — PHYSICAL EXAM
[General Appearance - Well Developed] : well developed [General Appearance - Well Nourished] : well nourished [Normal Appearance] : normal appearance [General Appearance - In No Acute Distress] : no acute distress [Bowel Sounds] : normal bowel sounds [Abdomen Mass (___ Cm)] : no abdominal mass palpated [Abdomen Hernia] : no hernia was discovered [Costovertebral Angle Tenderness] : no ~M costovertebral angle tenderness [Urethral Meatus] : normal urethra [External Female Genitalia] : normal external genitalia [Anus Abnormality] : the anus and perineum were normal [FreeTextEntry1] : No significant cystocele or rectocele

## 2021-10-29 LAB — URINE CYTOLOGY: NORMAL

## 2021-11-15 ENCOUNTER — NON-APPOINTMENT (OUTPATIENT)
Age: 71
End: 2021-11-15

## 2022-07-30 ENCOUNTER — TELEPHONE ENCOUNTER (OUTPATIENT)
Age: 72
End: 2022-07-30

## 2022-07-31 ENCOUNTER — TELEPHONE ENCOUNTER (OUTPATIENT)
Age: 72
End: 2022-07-31

## 2023-03-21 DIAGNOSIS — N63.0 UNSPECIFIED LUMP IN UNSPECIFIED BREAST: ICD-10-CM

## 2023-03-21 PROBLEM — I10 ESSENTIAL (PRIMARY) HYPERTENSION: Chronic | Status: ACTIVE | Noted: 2021-10-15

## 2023-03-21 PROBLEM — K57.92 DIVERTICULITIS OF INTESTINE, PART UNSPECIFIED, WITHOUT PERFORATION OR ABSCESS WITHOUT BLEEDING: Chronic | Status: ACTIVE | Noted: 2021-10-15

## 2023-03-21 PROBLEM — Z87.442 PERSONAL HISTORY OF URINARY CALCULI: Chronic | Status: ACTIVE | Noted: 2021-10-15

## 2023-03-22 ENCOUNTER — RESULT REVIEW (OUTPATIENT)
Age: 73
End: 2023-03-22

## 2023-03-22 ENCOUNTER — OUTPATIENT (OUTPATIENT)
Dept: OUTPATIENT SERVICES | Facility: HOSPITAL | Age: 73
LOS: 1 days | End: 2023-03-22
Payer: COMMERCIAL

## 2023-03-22 DIAGNOSIS — N63.20 UNSPECIFIED LUMP IN THE LEFT BREAST, UNSPECIFIED QUADRANT: ICD-10-CM

## 2023-03-22 DIAGNOSIS — N64.4 MASTODYNIA: ICD-10-CM

## 2023-03-22 DIAGNOSIS — Z90.49 ACQUIRED ABSENCE OF OTHER SPECIFIED PARTS OF DIGESTIVE TRACT: Chronic | ICD-10-CM

## 2023-03-22 DIAGNOSIS — N63.10 UNSPECIFIED LUMP IN THE RIGHT BREAST, UNSPECIFIED QUADRANT: ICD-10-CM

## 2023-03-22 PROCEDURE — G0279: CPT

## 2023-03-22 PROCEDURE — 76642 ULTRASOUND BREAST LIMITED: CPT | Mod: RT

## 2023-03-22 PROCEDURE — 77066 DX MAMMO INCL CAD BI: CPT | Mod: 26

## 2023-03-22 PROCEDURE — 76642 ULTRASOUND BREAST LIMITED: CPT | Mod: 26,RT

## 2023-03-22 PROCEDURE — 77066 DX MAMMO INCL CAD BI: CPT

## 2023-03-22 PROCEDURE — G0279: CPT | Mod: 26

## 2023-03-23 ENCOUNTER — APPOINTMENT (OUTPATIENT)
Dept: OBGYN | Facility: CLINIC | Age: 73
End: 2023-03-23
Payer: MEDICARE

## 2023-03-23 ENCOUNTER — OUTPATIENT (OUTPATIENT)
Dept: OUTPATIENT SERVICES | Facility: HOSPITAL | Age: 73
LOS: 1 days | End: 2023-03-23
Payer: MEDICARE

## 2023-03-23 VITALS
HEIGHT: 62 IN | BODY MASS INDEX: 29.63 KG/M2 | WEIGHT: 161 LBS | DIASTOLIC BLOOD PRESSURE: 70 MMHG | SYSTOLIC BLOOD PRESSURE: 120 MMHG

## 2023-03-23 DIAGNOSIS — Z00.00 ENCOUNTER FOR GENERAL ADULT MEDICAL EXAMINATION WITHOUT ABNORMAL FINDINGS: ICD-10-CM

## 2023-03-23 DIAGNOSIS — N63.10 UNSPECIFIED LUMP IN THE RIGHT BREAST, UNSPECIFIED QUADRANT: ICD-10-CM

## 2023-03-23 DIAGNOSIS — Z90.49 ACQUIRED ABSENCE OF OTHER SPECIFIED PARTS OF DIGESTIVE TRACT: Chronic | ICD-10-CM

## 2023-03-23 PROCEDURE — 99387 INIT PM E/M NEW PAT 65+ YRS: CPT | Mod: GY

## 2023-03-23 PROCEDURE — 99397 PER PM REEVAL EST PAT 65+ YR: CPT

## 2023-03-23 PROCEDURE — T1013: CPT

## 2023-03-23 NOTE — DISCUSSION/SUMMARY
[FreeTextEntry1] : 73yo  s/p hysterectomy, h/o breast lump here for annual with abnormal mammo yesterday (BIRADS 4)\par \par breast lump\par -f/u breast biopsy \par \par -smoking cessation discussed to decrease risk of cancers \par \par #HCM:\par - DEXA scan \par - RTC for next annual or PRN \par - f/u with PCP for colon cancer screen and urology for bladder screen

## 2023-03-23 NOTE — HISTORY OF PRESENT ILLNESS
[facility]    [address]    [facility] [address]    Phone:  [phone]       Thank You for choosing us for your health care visit. We are glad to serve you and happy to provide you with this summary of your visit. Please help us to ensure we have accurate records. If you find anything that needs to be changed, please let our staff know as soon as possible.          Your Demographic Information     Patient Name Sex     Violet Melara Female 1954       Ethnic Group Patient Race    Not of  or  Origin White      Your Visit Details     Date & Time Provider Department    2017 8:45 AM MD [facility]      Your To Do List     Future Orders Please Complete On or Around Expires    XR RIBS RIGHT W PA CHEST  May 01, 2017 2017      Conditions Discussed Today or Order-Related Diagnoses        Comments    Closed fracture of one rib of right side, initial encounter    -  Primary     Injury of chest wall, initial encounter     DOI:  17    Right leg injury, initial encounter     DOI:  17      Your Vitals Were     BP Pulse Temp Resp Height Weight    161/78 (BP Location: RUE) 58 98.1 °F (36.7 °C) (Oral) 18 5' 3.75\" (1.619 m) 169 lb (76.7 kg)    LMP SpO2 BMI Smoking Status          2008 99% 29.24 kg/m2 Never Smoker        Medications Prescribed or Re-Ordered Today     HYDROcodone-acetaminophen (NORCO) 5-325 MG per tablet    Si-2 P.O. Q 6 hours prn pain    Class: Normal    Pharmacy: [place] [id number] [place] [address] Ph #: [phone]      Your Current Medications Are        Disp Refills Start End    amLODIPine (NORVASC) 5 MG tablet        Sig - Route: Take 10 mg by mouth daily. - Oral    Class: Historical Med    hydrochlorothiazide (HYDRODIURIL) 12.5 MG tablet        Sig - Route: Take 25 mg by mouth daily.  - Oral    Class: Historical Med    WARFARIN SODIUM PO        Sig - Route: Take  by  mouth. - Oral    Class: Historical Med    HYDROcodone-acetaminophen (NORCO) 5-325 MG per tablet 40 tablet 0 2017     Si-2 P.O. Q 6 hours prn pain    warfarin (COUMADIN) 4 MG tablet 225 tablet 0 2014    Sig: TAKE 2 TO 2 1/2 TABLETS BY MOUTH DAILY OR AS DIRECTED.  BLOOD THINNER.    Class: Eprescribe      Allergies     No Known Allergies      Immunizations History as of 2017     Name Date    HERPES ZOSTER SHINGLES 2014      Problem List as of 2017     Malignant neoplasm of breast (female), unspecified site    Phlebitis and thrombophlebitis of other deep vessels of lower extremities    Asymptomatic varicose veins    Long term (current) use of anticoagulants    Recurrent erosion of cornea    Urge incontinence of urine    HTN (hypertension)              Patient Instructions      Rib Fracture    You broke one or more ribs. This is called a rib fracture. Rib fractures do not require a cast like other bones. They will heal by themselves in about 4-6 weeks. The first 3-4 weeks will be the most painful because deep breathing, coughing, or changing position from sitting to lying down, may cause the broken ends to move slightly.  Home care  · Rest. You should not be doing any heavy lifting or strenuous exertion until the pain goes away.  · It hurts to breathe when you have a broken rib. This puts you at risk of getting pneumonia from poor airflow through your lungs. To prevent this:  ¨ Take several very deep breaths once an hour while you're awake. Exhale through pursed lips as if you are blowing up a balloon. If possible, actually blow up a balloon or a rubber glove. This exercise builds up pressure inside the lung and prevents collapse of the small air sacs of the lung. This exercise may cause some pain at the site of injury, which is normal.  ¨ You may have gotten a breathing exercise device called an incentive spirometer. Use it at least 4 times a day, or as directed.  · Apply an ice pack  over the injured area for 15 to 20 minutes every 1 to 2 hours. You should do this for the first 24 to 48 hours. You can make an ice pack by filling a plastic bag that seals at the top with ice cubes and then wrapping it with a thin towel. Continue with ice packs as needed for the relief of pain and swelling.  · You may use over-the-counter pain medicine to control pain, unless another pain medicine was prescribed. If you have chronic liver or kidney disease or ever had a stomach ulcer or GI bleeding, talk with your healthcare provider before using these medicines.  · If your pain is not controlled, contact your healthcare provider. Sometimes a stronger pain medicine may be needed. A nerve block can be done in case of severe pain. It will numb the nerve between the ribs.  Follow-up care  Follow up with your healthcare provider, or as advised. Rarely, a broken rib will cause complications within the first few days that may not be evident during your initial exam. This can include collapsed lung, bleeding around the lung or into the abdomen, or pneumonia. Therefore, watch for the signs below.  If X-rays were taken, you will be told of any new findings that may affect your care.  Call 911  Call 911 if you have:  · Dizziness, weakness or fainting  · Shortness of breath with or without chest discomfort  · New or worsening abdominal pain  · Discomfort in other areas of your upper body such as your shoulders, jaw, neck, or arms  When to seek medical advice  Call your healthcare provider right away if any of these occur:  · Increasing chest pain with breathing  · Fever of 100.4°F (38°C) or above lasting for 24 to 48 hours  · Congested cough  © 8551-4973 [employer]. [address] [place] [address], [place] [address]. All rights reserved. This information is not intended as a substitute for professional medical care. Always follow your healthcare professional's instructions.              [FreeTextEntry1] : 71yo P2  g/o right breast lump, bladder tumor s/p resection, s/p hysterectomy in 1990 here for annual \par Patient reports right breast pain x few months and noted a lump a week ago\par \par Mammogram and breast sono were done yesterday\par \par Denies postmenopausal bleeding \par \par Currently not sexually active \par \par Denies pelvic pain, unusual vaginal discharge, SI/UI or dyspareunia \par Denies significant family h/o GI/GYN cancer \par \par Reports some bloating and occasional constipation. Denies unintentional weightloss \par \par ObHx: CS x2 \par GYN Hx: Denies h/o fibroids , abnormal pap smears, STI \par \par \par HCM:\par \par last pap: wnl \par mammo: performed 3/22 BIRADS 4\par colonoscopy (>45): f/u with PCP\par DEXA (>65)- ordered\par \par   [Localized Pain] : localized pain [Palpable Lump] : palpable lump [Intermittent] : intermittent [Mild] : mild [TextBox_34] : Pain is in the right armpit and associated with movement

## 2023-03-23 NOTE — PHYSICAL EXAM
[FreeTextEntry6] : 5 cm lumpectomy scar on right breast  [Examination Of The Breasts] : a normal appearance [No Masses] : no breast masses were palpable [Labia Majora] : normal [Labia Minora] : normal [Cystocele] : a cystocele [Rectocele] : a rectocele [Absent] : absent [Normal] : normal [Uterine Adnexae] : normal [FreeTextEntry4] : grade 1 prolapse

## 2023-03-24 DIAGNOSIS — Z01.419 ENCOUNTER FOR GYNECOLOGICAL EXAMINATION (GENERAL) (ROUTINE) WITHOUT ABNORMAL FINDINGS: ICD-10-CM

## 2023-03-29 ENCOUNTER — RESULT REVIEW (OUTPATIENT)
Age: 73
End: 2023-03-29

## 2023-03-29 ENCOUNTER — OUTPATIENT (OUTPATIENT)
Dept: OUTPATIENT SERVICES | Facility: HOSPITAL | Age: 73
LOS: 1 days | End: 2023-03-29
Payer: MEDICARE

## 2023-03-29 DIAGNOSIS — R92.8 OTHER ABNORMAL AND INCONCLUSIVE FINDINGS ON DIAGNOSTIC IMAGING OF BREAST: ICD-10-CM

## 2023-03-29 DIAGNOSIS — Z90.49 ACQUIRED ABSENCE OF OTHER SPECIFIED PARTS OF DIGESTIVE TRACT: Chronic | ICD-10-CM

## 2023-03-29 PROCEDURE — 88360 TUMOR IMMUNOHISTOCHEM/MANUAL: CPT | Mod: 26

## 2023-03-29 PROCEDURE — 77065 DX MAMMO INCL CAD UNI: CPT | Mod: 26,RT

## 2023-03-29 PROCEDURE — 19084 BX BREAST ADD LESION US IMAG: CPT | Mod: RT

## 2023-03-29 PROCEDURE — 88341 IMHCHEM/IMCYTCHM EA ADD ANTB: CPT | Mod: 26,59

## 2023-03-29 PROCEDURE — 88360 TUMOR IMMUNOHISTOCHEM/MANUAL: CPT

## 2023-03-29 PROCEDURE — 88342 IMHCHEM/IMCYTCHM 1ST ANTB: CPT | Mod: 26,59

## 2023-03-29 PROCEDURE — 88305 TISSUE EXAM BY PATHOLOGIST: CPT

## 2023-03-29 PROCEDURE — 88341 IMHCHEM/IMCYTCHM EA ADD ANTB: CPT

## 2023-03-29 PROCEDURE — A4648: CPT

## 2023-03-29 PROCEDURE — 19083 BX BREAST 1ST LESION US IMAG: CPT | Mod: RT

## 2023-03-29 PROCEDURE — 88342 IMHCHEM/IMCYTCHM 1ST ANTB: CPT

## 2023-03-29 PROCEDURE — 88305 TISSUE EXAM BY PATHOLOGIST: CPT | Mod: 26

## 2023-03-29 PROCEDURE — 77065 DX MAMMO INCL CAD UNI: CPT | Mod: RT

## 2023-03-31 LAB — SURGICAL PATHOLOGY STUDY: SIGNIFICANT CHANGE UP

## 2023-04-03 DIAGNOSIS — N63.10 UNSPECIFIED LUMP IN THE RIGHT BREAST, UNSPECIFIED QUADRANT: ICD-10-CM

## 2023-04-03 DIAGNOSIS — Z17.0 ESTROGEN RECEPTOR POSITIVE STATUS [ER+]: ICD-10-CM

## 2023-04-03 DIAGNOSIS — C50.911 MALIGNANT NEOPLASM OF UNSPECIFIED SITE OF RIGHT FEMALE BREAST: ICD-10-CM

## 2023-04-06 ENCOUNTER — NON-APPOINTMENT (OUTPATIENT)
Age: 73
End: 2023-04-06

## 2023-04-14 ENCOUNTER — INPATIENT (INPATIENT)
Facility: HOSPITAL | Age: 73
LOS: 1 days | Discharge: HOME | DRG: 392 | End: 2023-04-16
Attending: HOSPITALIST | Admitting: HOSPITALIST
Payer: MEDICARE

## 2023-04-14 VITALS
OXYGEN SATURATION: 96 % | SYSTOLIC BLOOD PRESSURE: 146 MMHG | TEMPERATURE: 98 F | DIASTOLIC BLOOD PRESSURE: 68 MMHG | HEART RATE: 100 BPM | RESPIRATION RATE: 18 BRPM

## 2023-04-14 DIAGNOSIS — R10.9 UNSPECIFIED ABDOMINAL PAIN: ICD-10-CM

## 2023-04-14 DIAGNOSIS — Z90.49 ACQUIRED ABSENCE OF OTHER SPECIFIED PARTS OF DIGESTIVE TRACT: Chronic | ICD-10-CM

## 2023-04-14 LAB
ALBUMIN SERPL ELPH-MCNC: 4.5 G/DL — SIGNIFICANT CHANGE UP (ref 3.5–5.2)
ALP SERPL-CCNC: 116 U/L — HIGH (ref 30–115)
ALT FLD-CCNC: 21 U/L — SIGNIFICANT CHANGE UP (ref 0–41)
ANION GAP SERPL CALC-SCNC: 10 MMOL/L — SIGNIFICANT CHANGE UP (ref 7–14)
APPEARANCE UR: CLEAR — SIGNIFICANT CHANGE UP
APTT BLD: 28.3 SEC — SIGNIFICANT CHANGE UP (ref 27–39.2)
AST SERPL-CCNC: 29 U/L — SIGNIFICANT CHANGE UP (ref 0–41)
BASOPHILS # BLD AUTO: 0.04 K/UL — SIGNIFICANT CHANGE UP (ref 0–0.2)
BASOPHILS NFR BLD AUTO: 0.3 % — SIGNIFICANT CHANGE UP (ref 0–1)
BILIRUB SERPL-MCNC: 0.2 MG/DL — SIGNIFICANT CHANGE UP (ref 0.2–1.2)
BILIRUB UR-MCNC: NEGATIVE — SIGNIFICANT CHANGE UP
BUN SERPL-MCNC: 14 MG/DL — SIGNIFICANT CHANGE UP (ref 10–20)
CALCIUM SERPL-MCNC: 10.3 MG/DL — SIGNIFICANT CHANGE UP (ref 8.4–10.4)
CHLORIDE SERPL-SCNC: 104 MMOL/L — SIGNIFICANT CHANGE UP (ref 98–110)
CO2 SERPL-SCNC: 28 MMOL/L — SIGNIFICANT CHANGE UP (ref 17–32)
COLOR SPEC: YELLOW — SIGNIFICANT CHANGE UP
CREAT SERPL-MCNC: 0.7 MG/DL — SIGNIFICANT CHANGE UP (ref 0.7–1.5)
DIFF PNL FLD: NEGATIVE — SIGNIFICANT CHANGE UP
EGFR: 91 ML/MIN/1.73M2 — SIGNIFICANT CHANGE UP
EOSINOPHIL # BLD AUTO: 0.17 K/UL — SIGNIFICANT CHANGE UP (ref 0–0.7)
EOSINOPHIL NFR BLD AUTO: 1.3 % — SIGNIFICANT CHANGE UP (ref 0–8)
GLUCOSE SERPL-MCNC: 84 MG/DL — SIGNIFICANT CHANGE UP (ref 70–99)
GLUCOSE UR QL: NEGATIVE — SIGNIFICANT CHANGE UP
HCT VFR BLD CALC: 42.1 % — SIGNIFICANT CHANGE UP (ref 37–47)
HGB BLD-MCNC: 14.3 G/DL — SIGNIFICANT CHANGE UP (ref 12–16)
IMM GRANULOCYTES NFR BLD AUTO: 0.3 % — SIGNIFICANT CHANGE UP (ref 0.1–0.3)
INR BLD: 1 RATIO — SIGNIFICANT CHANGE UP (ref 0.65–1.3)
KETONES UR-MCNC: NEGATIVE — SIGNIFICANT CHANGE UP
LACTATE SERPL-SCNC: 0.8 MMOL/L — SIGNIFICANT CHANGE UP (ref 0.7–2)
LEUKOCYTE ESTERASE UR-ACNC: NEGATIVE — SIGNIFICANT CHANGE UP
LIDOCAIN IGE QN: 63 U/L — HIGH (ref 7–60)
LYMPHOCYTES # BLD AUTO: 25.8 % — SIGNIFICANT CHANGE UP (ref 20.5–51.1)
LYMPHOCYTES # BLD AUTO: 3.46 K/UL — HIGH (ref 1.2–3.4)
MCHC RBC-ENTMCNC: 30.8 PG — SIGNIFICANT CHANGE UP (ref 27–31)
MCHC RBC-ENTMCNC: 34 G/DL — SIGNIFICANT CHANGE UP (ref 32–37)
MCV RBC AUTO: 90.5 FL — SIGNIFICANT CHANGE UP (ref 81–99)
MONOCYTES # BLD AUTO: 0.83 K/UL — HIGH (ref 0.1–0.6)
MONOCYTES NFR BLD AUTO: 6.2 % — SIGNIFICANT CHANGE UP (ref 1.7–9.3)
NEUTROPHILS # BLD AUTO: 8.85 K/UL — HIGH (ref 1.4–6.5)
NEUTROPHILS NFR BLD AUTO: 66.1 % — SIGNIFICANT CHANGE UP (ref 42.2–75.2)
NITRITE UR-MCNC: NEGATIVE — SIGNIFICANT CHANGE UP
NRBC # BLD: 0 /100 WBCS — SIGNIFICANT CHANGE UP (ref 0–0)
PH UR: 6.5 — SIGNIFICANT CHANGE UP (ref 5–8)
PLATELET # BLD AUTO: 185 K/UL — SIGNIFICANT CHANGE UP (ref 130–400)
PMV BLD: 10.8 FL — HIGH (ref 7.4–10.4)
POTASSIUM SERPL-MCNC: 5.3 MMOL/L — HIGH (ref 3.5–5)
POTASSIUM SERPL-SCNC: 5.3 MMOL/L — HIGH (ref 3.5–5)
PROT SERPL-MCNC: 7.2 G/DL — SIGNIFICANT CHANGE UP (ref 6–8)
PROT UR-MCNC: NEGATIVE — SIGNIFICANT CHANGE UP
PROTHROM AB SERPL-ACNC: 11.4 SEC — SIGNIFICANT CHANGE UP (ref 9.95–12.87)
RBC # BLD: 4.65 M/UL — SIGNIFICANT CHANGE UP (ref 4.2–5.4)
RBC # FLD: 12.5 % — SIGNIFICANT CHANGE UP (ref 11.5–14.5)
SARS-COV-2 RNA SPEC QL NAA+PROBE: SIGNIFICANT CHANGE UP
SODIUM SERPL-SCNC: 142 MMOL/L — SIGNIFICANT CHANGE UP (ref 135–146)
SP GR SPEC: 1.01 — SIGNIFICANT CHANGE UP (ref 1.01–1.03)
UROBILINOGEN FLD QL: SIGNIFICANT CHANGE UP
WBC # BLD: 13.39 K/UL — HIGH (ref 4.8–10.8)
WBC # FLD AUTO: 13.39 K/UL — HIGH (ref 4.8–10.8)

## 2023-04-14 PROCEDURE — 80053 COMPREHEN METABOLIC PANEL: CPT

## 2023-04-14 PROCEDURE — 86803 HEPATITIS C AB TEST: CPT

## 2023-04-14 PROCEDURE — 83735 ASSAY OF MAGNESIUM: CPT

## 2023-04-14 PROCEDURE — 36415 COLL VENOUS BLD VENIPUNCTURE: CPT

## 2023-04-14 PROCEDURE — 85730 THROMBOPLASTIN TIME PARTIAL: CPT

## 2023-04-14 PROCEDURE — 93005 ELECTROCARDIOGRAM TRACING: CPT

## 2023-04-14 PROCEDURE — 85025 COMPLETE CBC W/AUTO DIFF WBC: CPT

## 2023-04-14 PROCEDURE — 80061 LIPID PANEL: CPT

## 2023-04-14 PROCEDURE — 74177 CT ABD & PELVIS W/CONTRAST: CPT | Mod: 26,MA

## 2023-04-14 PROCEDURE — 97161 PT EVAL LOW COMPLEX 20 MIN: CPT | Mod: GP

## 2023-04-14 PROCEDURE — 99285 EMERGENCY DEPT VISIT HI MDM: CPT

## 2023-04-14 PROCEDURE — 85610 PROTHROMBIN TIME: CPT

## 2023-04-14 RX ORDER — ONDANSETRON 8 MG/1
4 TABLET, FILM COATED ORAL ONCE
Refills: 0 | Status: COMPLETED | OUTPATIENT
Start: 2023-04-14 | End: 2023-04-14

## 2023-04-14 RX ORDER — HYDROMORPHONE HYDROCHLORIDE 2 MG/ML
0.5 INJECTION INTRAMUSCULAR; INTRAVENOUS; SUBCUTANEOUS ONCE
Refills: 0 | Status: DISCONTINUED | OUTPATIENT
Start: 2023-04-14 | End: 2023-04-14

## 2023-04-14 RX ORDER — MORPHINE SULFATE 50 MG/1
4 CAPSULE, EXTENDED RELEASE ORAL ONCE
Refills: 0 | Status: DISCONTINUED | OUTPATIENT
Start: 2023-04-14 | End: 2023-04-14

## 2023-04-14 RX ORDER — METOCLOPRAMIDE HCL 10 MG
10 TABLET ORAL ONCE
Refills: 0 | Status: COMPLETED | OUTPATIENT
Start: 2023-04-14 | End: 2023-04-14

## 2023-04-14 RX ORDER — DIATRIZOATE MEGLUMINE 180 MG/ML
30 INJECTION, SOLUTION INTRAVESICAL ONCE
Refills: 0 | Status: COMPLETED | OUTPATIENT
Start: 2023-04-14 | End: 2023-04-14

## 2023-04-14 RX ORDER — SODIUM CHLORIDE 9 MG/ML
1000 INJECTION INTRAMUSCULAR; INTRAVENOUS; SUBCUTANEOUS ONCE
Refills: 0 | Status: COMPLETED | OUTPATIENT
Start: 2023-04-14 | End: 2023-04-14

## 2023-04-14 RX ADMIN — MORPHINE SULFATE 4 MILLIGRAM(S): 50 CAPSULE, EXTENDED RELEASE ORAL at 17:01

## 2023-04-14 RX ADMIN — Medication 104 MILLIGRAM(S): at 21:00

## 2023-04-14 RX ADMIN — HYDROMORPHONE HYDROCHLORIDE 0.5 MILLIGRAM(S): 2 INJECTION INTRAMUSCULAR; INTRAVENOUS; SUBCUTANEOUS at 21:49

## 2023-04-14 RX ADMIN — HYDROMORPHONE HYDROCHLORIDE 0.5 MILLIGRAM(S): 2 INJECTION INTRAMUSCULAR; INTRAVENOUS; SUBCUTANEOUS at 17:50

## 2023-04-14 RX ADMIN — DIATRIZOATE MEGLUMINE 30 MILLILITER(S): 180 INJECTION, SOLUTION INTRAVESICAL at 17:01

## 2023-04-14 RX ADMIN — ONDANSETRON 4 MILLIGRAM(S): 8 TABLET, FILM COATED ORAL at 17:01

## 2023-04-14 RX ADMIN — SODIUM CHLORIDE 2000 MILLILITER(S): 9 INJECTION INTRAMUSCULAR; INTRAVENOUS; SUBCUTANEOUS at 17:01

## 2023-04-14 RX ADMIN — ONDANSETRON 4 MILLIGRAM(S): 8 TABLET, FILM COATED ORAL at 21:48

## 2023-04-14 NOTE — ED PROVIDER NOTE - CLINICAL SUMMARY MEDICAL DECISION MAKING FREE TEXT BOX
Provider Procedure Text (A): After obtaining clear surgical margins the defect was repaired by another provider. patien sonyaith perstient abd pain and vomiting despite iv narcotics and antiemetcs. ct scan obtained and was nml. lab work showed wbc of 13. patient admit for further medications and ivf.

## 2023-04-14 NOTE — ED PROVIDER NOTE - PROGRESS NOTE DETAILS
Patient is admitted for pain control and vomiting. Pt is aware of the plan and agrees. Pt has been endorsed to MAR. Patient has been notified of the CAT scan findings.

## 2023-04-14 NOTE — PATIENT PROFILE ADULT - NSPROIMPLANTSMEDDEV_GEN_A_NUR
[FreeTextEntry1] : the oncology and pain management consultations were reviewed, CBC and TSH were ordered to follow up on the anemia and dizziness, CMP and lipids for her cardiovascular status, Vitamin D to further evaluate the osteoporosis, B12 level to follow up on the elevated level in the past, iron studies for the anemia, she will be called with results None

## 2023-04-14 NOTE — ED PROVIDER NOTE - OBJECTIVE STATEMENT
72-year-old female with past medical history of breast cancer diagnosed in 2 weeks ago and following with oncologist [place] [facility] [place], hypertension, perforated diverticulitis, kidney stone, appendectomy, and hysterectomy who presents with periumbilical abdominal pain.  Reports that symptoms started 3 days ago, intermittent, sharp, and radiating to her left side of abdomen.  Also endorses nausea and vomiting.  Denies fever, shortness of breath, chest pain, hematuria, dysuria, melena, and hematochezia.

## 2023-04-14 NOTE — PATIENT PROFILE ADULT - FALL HARM RISK - HARM RISK INTERVENTIONS

## 2023-04-14 NOTE — ED PROVIDER NOTE - PHYSICAL EXAMINATION
CONSTITUTIONAL: in no apparent distress.   HEAD: Normocephalic; atraumatic.   EYES: Pupils are round and reactive, extra-ocular muscles are intact. Eyelids are normal in appearance without swelling or lesions.   ENT: Hearing is intact with good acuity to spoken voice.  Patient is speaking clearly, not muffled and airway is intact.   RESPIRATORY: No signs of respiratory distress. Lung sounds are clear in all lobes bilaterally without rales, rhonchi, or wheezes.  CARDIOVASCULAR: Regular rate and rhythm.   GI: Mid and L abdomen tender to palpation. Abdomen is soft, and without distention. Bowel sounds are present and normoactive in all four quadrants. No masses are noted.   BACK: No evidence of trauma or deformity. No CVA tenderness bilaterally. Normal ROM.   NEURO: A & O x 3. Normal speech. No focal deficit.  PSYCHOLOGICAL: Appropriate mood and affect. Good judgement and insight.

## 2023-04-14 NOTE — ED PROVIDER NOTE - ATTENDING APP SHARED VISIT CONTRIBUTION OF CARE
Left-sided abdominal pain associated with vomiting denies any urinary complaints no flank pain recent diagnosis of breast cancer symptoms for a few days on exam there is mild tenderness to palpation on the left side plan is to obtain labs and CT scan

## 2023-04-15 LAB
ALBUMIN SERPL ELPH-MCNC: 3.9 G/DL — SIGNIFICANT CHANGE UP (ref 3.5–5.2)
ALP SERPL-CCNC: 142 U/L — HIGH (ref 30–115)
ALT FLD-CCNC: 78 U/L — HIGH (ref 0–41)
ANION GAP SERPL CALC-SCNC: 8 MMOL/L — SIGNIFICANT CHANGE UP (ref 7–14)
APTT BLD: 38.4 SEC — SIGNIFICANT CHANGE UP (ref 27–39.2)
AST SERPL-CCNC: 63 U/L — HIGH (ref 0–41)
BASOPHILS # BLD AUTO: 0.02 K/UL — SIGNIFICANT CHANGE UP (ref 0–0.2)
BASOPHILS NFR BLD AUTO: 0.2 % — SIGNIFICANT CHANGE UP (ref 0–1)
BILIRUB SERPL-MCNC: 0.5 MG/DL — SIGNIFICANT CHANGE UP (ref 0.2–1.2)
BUN SERPL-MCNC: 15 MG/DL — SIGNIFICANT CHANGE UP (ref 10–20)
CALCIUM SERPL-MCNC: 9.4 MG/DL — SIGNIFICANT CHANGE UP (ref 8.4–10.4)
CHLORIDE SERPL-SCNC: 105 MMOL/L — SIGNIFICANT CHANGE UP (ref 98–110)
CHOLEST SERPL-MCNC: 150 MG/DL — SIGNIFICANT CHANGE UP
CO2 SERPL-SCNC: 29 MMOL/L — SIGNIFICANT CHANGE UP (ref 17–32)
CREAT SERPL-MCNC: 0.6 MG/DL — LOW (ref 0.7–1.5)
EGFR: 95 ML/MIN/1.73M2 — SIGNIFICANT CHANGE UP
EOSINOPHIL # BLD AUTO: 0.03 K/UL — SIGNIFICANT CHANGE UP (ref 0–0.7)
EOSINOPHIL NFR BLD AUTO: 0.3 % — SIGNIFICANT CHANGE UP (ref 0–8)
GLUCOSE SERPL-MCNC: 102 MG/DL — HIGH (ref 70–99)
HCT VFR BLD CALC: 38.6 % — SIGNIFICANT CHANGE UP (ref 37–47)
HCV AB S/CO SERPL IA: 0.03 COI — SIGNIFICANT CHANGE UP
HCV AB SERPL-IMP: SIGNIFICANT CHANGE UP
HDLC SERPL-MCNC: 49 MG/DL — LOW
HGB BLD-MCNC: 12.8 G/DL — SIGNIFICANT CHANGE UP (ref 12–16)
IMM GRANULOCYTES NFR BLD AUTO: 0.4 % — HIGH (ref 0.1–0.3)
INR BLD: 1.09 RATIO — SIGNIFICANT CHANGE UP (ref 0.65–1.3)
LIPID PNL WITH DIRECT LDL SERPL: 78 MG/DL — SIGNIFICANT CHANGE UP
LYMPHOCYTES # BLD AUTO: 18.2 % — LOW (ref 20.5–51.1)
LYMPHOCYTES # BLD AUTO: 2.08 K/UL — SIGNIFICANT CHANGE UP (ref 1.2–3.4)
MCHC RBC-ENTMCNC: 30.2 PG — SIGNIFICANT CHANGE UP (ref 27–31)
MCHC RBC-ENTMCNC: 33.2 G/DL — SIGNIFICANT CHANGE UP (ref 32–37)
MCV RBC AUTO: 91 FL — SIGNIFICANT CHANGE UP (ref 81–99)
MONOCYTES # BLD AUTO: 0.66 K/UL — HIGH (ref 0.1–0.6)
MONOCYTES NFR BLD AUTO: 5.8 % — SIGNIFICANT CHANGE UP (ref 1.7–9.3)
NEUTROPHILS # BLD AUTO: 8.56 K/UL — HIGH (ref 1.4–6.5)
NEUTROPHILS NFR BLD AUTO: 75.1 % — SIGNIFICANT CHANGE UP (ref 42.2–75.2)
NON HDL CHOLESTEROL: 101 MG/DL — SIGNIFICANT CHANGE UP
NRBC # BLD: 0 /100 WBCS — SIGNIFICANT CHANGE UP (ref 0–0)
PLATELET # BLD AUTO: 218 K/UL — SIGNIFICANT CHANGE UP (ref 130–400)
PMV BLD: 10.7 FL — HIGH (ref 7.4–10.4)
POTASSIUM SERPL-MCNC: 4.4 MMOL/L — SIGNIFICANT CHANGE UP (ref 3.5–5)
POTASSIUM SERPL-SCNC: 4.4 MMOL/L — SIGNIFICANT CHANGE UP (ref 3.5–5)
PROT SERPL-MCNC: 6.1 G/DL — SIGNIFICANT CHANGE UP (ref 6–8)
PROTHROM AB SERPL-ACNC: 12.5 SEC — SIGNIFICANT CHANGE UP (ref 9.95–12.87)
RBC # BLD: 4.24 M/UL — SIGNIFICANT CHANGE UP (ref 4.2–5.4)
RBC # FLD: 12.8 % — SIGNIFICANT CHANGE UP (ref 11.5–14.5)
SODIUM SERPL-SCNC: 142 MMOL/L — SIGNIFICANT CHANGE UP (ref 135–146)
TRIGL SERPL-MCNC: 116 MG/DL — SIGNIFICANT CHANGE UP
WBC # BLD: 11.4 K/UL — HIGH (ref 4.8–10.8)
WBC # FLD AUTO: 11.4 K/UL — HIGH (ref 4.8–10.8)

## 2023-04-15 PROCEDURE — 93010 ELECTROCARDIOGRAM REPORT: CPT

## 2023-04-15 PROCEDURE — 99233 SBSQ HOSP IP/OBS HIGH 50: CPT

## 2023-04-15 RX ORDER — PANTOPRAZOLE SODIUM 20 MG/1
40 TABLET, DELAYED RELEASE ORAL EVERY 12 HOURS
Refills: 0 | Status: DISCONTINUED | OUTPATIENT
Start: 2023-04-15 | End: 2023-04-16

## 2023-04-15 RX ORDER — ATORVASTATIN CALCIUM 80 MG/1
1 TABLET, FILM COATED ORAL
Refills: 0 | DISCHARGE

## 2023-04-15 RX ORDER — SODIUM CHLORIDE 9 MG/ML
1000 INJECTION INTRAMUSCULAR; INTRAVENOUS; SUBCUTANEOUS
Refills: 0 | Status: DISCONTINUED | OUTPATIENT
Start: 2023-04-15 | End: 2023-04-16

## 2023-04-15 RX ORDER — LANOLIN ALCOHOL/MO/W.PET/CERES
3 CREAM (GRAM) TOPICAL AT BEDTIME
Refills: 0 | Status: DISCONTINUED | OUTPATIENT
Start: 2023-04-15 | End: 2023-04-16

## 2023-04-15 RX ORDER — ALPRAZOLAM 0.25 MG
1 TABLET ORAL
Refills: 0 | DISCHARGE

## 2023-04-15 RX ORDER — FAMOTIDINE 10 MG/ML
1 INJECTION INTRAVENOUS
Refills: 0 | DISCHARGE

## 2023-04-15 RX ORDER — MONTELUKAST 4 MG/1
10 TABLET, CHEWABLE ORAL AT BEDTIME
Refills: 0 | Status: DISCONTINUED | OUTPATIENT
Start: 2023-04-15 | End: 2023-04-16

## 2023-04-15 RX ORDER — FOLIC ACID/VIT B COMPLEX AND C 400 MCG
1 TABLET ORAL
Refills: 0 | DISCHARGE

## 2023-04-15 RX ORDER — ACETAMINOPHEN 500 MG
650 TABLET ORAL EVERY 6 HOURS
Refills: 0 | Status: DISCONTINUED | OUTPATIENT
Start: 2023-04-15 | End: 2023-04-16

## 2023-04-15 RX ORDER — MONTELUKAST 4 MG/1
1 TABLET, CHEWABLE ORAL
Refills: 0 | DISCHARGE

## 2023-04-15 RX ORDER — ALPRAZOLAM 0.25 MG
0.5 TABLET ORAL DAILY
Refills: 0 | Status: DISCONTINUED | OUTPATIENT
Start: 2023-04-15 | End: 2023-04-16

## 2023-04-15 RX ORDER — LOSARTAN/HYDROCHLOROTHIAZIDE 100MG-25MG
1 TABLET ORAL
Refills: 0 | DISCHARGE

## 2023-04-15 RX ORDER — LOSARTAN POTASSIUM 100 MG/1
50 TABLET, FILM COATED ORAL DAILY
Refills: 0 | Status: DISCONTINUED | OUTPATIENT
Start: 2023-04-15 | End: 2023-04-15

## 2023-04-15 RX ORDER — ONDANSETRON 8 MG/1
4 TABLET, FILM COATED ORAL EVERY 8 HOURS
Refills: 0 | Status: DISCONTINUED | OUTPATIENT
Start: 2023-04-15 | End: 2023-04-16

## 2023-04-15 RX ORDER — ATORVASTATIN CALCIUM 80 MG/1
40 TABLET, FILM COATED ORAL AT BEDTIME
Refills: 0 | Status: DISCONTINUED | OUTPATIENT
Start: 2023-04-15 | End: 2023-04-16

## 2023-04-15 RX ORDER — FAMOTIDINE 10 MG/ML
20 INJECTION INTRAVENOUS
Refills: 0 | Status: DISCONTINUED | OUTPATIENT
Start: 2023-04-15 | End: 2023-04-16

## 2023-04-15 RX ADMIN — ATORVASTATIN CALCIUM 40 MILLIGRAM(S): 80 TABLET, FILM COATED ORAL at 21:31

## 2023-04-15 RX ADMIN — FAMOTIDINE 20 MILLIGRAM(S): 10 INJECTION INTRAVENOUS at 06:33

## 2023-04-15 RX ADMIN — PANTOPRAZOLE SODIUM 40 MILLIGRAM(S): 20 TABLET, DELAYED RELEASE ORAL at 17:22

## 2023-04-15 RX ADMIN — Medication 1 TABLET(S): at 11:44

## 2023-04-15 RX ADMIN — MONTELUKAST 10 MILLIGRAM(S): 4 TABLET, CHEWABLE ORAL at 21:31

## 2023-04-15 RX ADMIN — FAMOTIDINE 20 MILLIGRAM(S): 10 INJECTION INTRAVENOUS at 17:22

## 2023-04-15 NOTE — H&P ADULT - NSHPLABSRESULTS_GEN_ALL_CORE
Complete Blood Count + Automated Diff (04.14.23 @ 16:59)    WBC Count: 13.39 K/uL    RBC Count: 4.65 M/uL    Hemoglobin: 14.3 g/dL    Hematocrit: 42.1 %    Mean Cell Volume: 90.5 fL    Mean Cell Hemoglobin: 30.8 pg    Mean Cell Hemoglobin Conc: 34.0 g/dL    Red Cell Distrib Width: 12.5 %    Platelet Count - Automated: 185 K/uL    MPV: 10.8 fL    Auto Neutrophil #: 8.85 K/uL    Auto Lymphocyte #: 3.46 K/uL    Auto Monocyte #: 0.83 K/uL    Auto Eosinophil #: 0.17 K/uL    Auto Basophil #: 0.04 K/uL    Auto Neutrophil %: 66.1: Differential percentages must be correlated with absolute numbers for  clinical significance. %    Auto Lymphocyte %: 25.8 %    Auto Monocyte %: 6.2 %    Auto Eosinophil %: 1.3 %    Auto Basophil %: 0.3 %    Auto Immature Granulocyte %: 0.3: (Includes meta, myelo and promyelocytes). Mild elevations in immature  granulocytes may be seen with many inflammatory processes and pregnancy;  clinical correlation suggested. %    Nucleated RBC: 0 /100 WBCs    Comprehensive Metabolic Panel (04.14.23 @ 16:59)    Sodium, Serum: 142 mmol/L    Potassium, Serum: 5.3: Hemolyzed. Interpret with caution mmol/L    Chloride, Serum: 104 mmol/L    Carbon Dioxide, Serum: 28 mmol/L    Anion Gap, Serum: 10 mmol/L    Blood Urea Nitrogen, Serum: 14 mg/dL    Creatinine, Serum: 0.7 mg/dL    Glucose, Serum: 84 mg/dL    Calcium, Total Serum: 10.3 mg/dL    Protein Total, Serum: 7.2 g/dL    Albumin, Serum: 4.5 g/dL    Bilirubin Total, Serum: 0.2 mg/dL    Alkaline Phosphatase, Serum: 116 U/L    Aspartate Aminotransferase (AST/SGOT): 29: Hemolyzed. Interpret with caution U/L    Alanine Aminotransferase (ALT/SGPT): 21: Hemolyzed. Interpret with caution U/L    eGFR: 91: The estimated glomerular filtration rate (eGFR) is calculated using the  2021 CKD-EPI creatinine equation, which does not have a coefficient for  race and is validated in individuals 18 years of age and older (N Engl J  Med 2021; 385:4638-2831). Creatinine-based eGFR may be inaccurate in  various situations including but not limited to extremes of muscle mass,  altered dietary protein intake, or medications that affect renal tubular  creatinine secretion. mL/min/1.73m2

## 2023-04-15 NOTE — CHART NOTE - NSCHARTNOTEFT_GEN_A_CORE
Pt seen bedside this am. Complaining of epigastric abdominal pain and LLQ pain. No vomiting or nausea.      - CTAP shows bladder thickening but nothing in the stomach/bowel/liver  - Pt already on famotidine  - Added ppi bid  - Pt's BP on low side so held pt's BP meds: HCTZ 12.5mg qd and losartan 50mg qd)  - will give NS for a short interval  - continue to monitor

## 2023-04-15 NOTE — H&P ADULT - HISTORY OF PRESENT ILLNESS
72-year-old female with past medical history of breast cancer diagnosed in 2 weeks ago and following with oncologist at [facility] [place], hypertension, perforated diverticulitis, kidney stone, appendectomy, and hysterectomy who presents with periumbilical abdominal pain.  Reports that symptoms started 3 days ago, intermittent, sharp, and radiating to her left side of abdomen.  Also endorses nausea and vomiting. .    Vital Signs Last 24 Hrs  T(C): 36.1 (14 Apr 2023 23:33), Max: 36.7 (14 Apr 2023 15:20)  T(F): 97 (14 Apr 2023 23:33), Max: 98 (14 Apr 2023 15:20)  HR: 88 (14 Apr 2023 23:33) (88 - 100)  BP: 149/67 (14 Apr 2023 23:33) (146/68 - 149/67)  RR: 18 (14 Apr 2023 23:33) (18 - 18)  SpO2: 96% (14 Apr 2023 23:33) (96% - 96%)    In the ED patient had a CT abdomen and pelvis which showed stable renal cysts, apparent new focal thickening at the base of the anterior bladder wall, cannot exclude neoplasm (8-64). Recommend cystoscopy for further evaluation.No other CT evidence of acute abdominopelvic pathology.    Labs were significant for WBC count 13k and potassium of 5.4 (hemolyzed).  72-year-old female with past medical history of breast cancer diagnosed in 2 weeks ago and following with oncologist at [facility] [place], hypertension, perforated diverticulitis, kidney stone, appendectomy, and hysterectomy who presents with abdominal pain for the last 5 days. She says that the pain comes and goes, but is more frequent at night time. It is sharp and nature, mostly generalized but at times localizes to the left side, not associated with eating.  Also endorses nausea and vomiting for the last two days. Patient states that she hasn't had any fevers, but feels chills. Last bowel movement was yesterday, denies diarrhea or constipation.     Vital Signs Last 24 Hrs  T(C): 36.1 (14 Apr 2023 23:33), Max: 36.7 (14 Apr 2023 15:20)  T(F): 97 (14 Apr 2023 23:33), Max: 98 (14 Apr 2023 15:20)  HR: 88 (14 Apr 2023 23:33) (88 - 100)  BP: 149/67 (14 Apr 2023 23:33) (146/68 - 149/67)  RR: 18 (14 Apr 2023 23:33) (18 - 18)  SpO2: 96% (14 Apr 2023 23:33) (96% - 96%)    In the ED patient had a CT abdomen and pelvis which showed stable renal cysts, apparent new focal thickening at the base of the anterior bladder wall, cannot exclude neoplasm (8-64). Recommend cystoscopy for further evaluation.No other CT evidence of acute abdominopelvic pathology.    Labs were significant for WBC count 13k, lipase 63, and potassium of 5.4 (hemolyzed).

## 2023-04-15 NOTE — H&P ADULT - NSHPPHYSICALEXAM_GEN_ALL_CORE
PHYSICAL EXAM:  GENERAL: NAD, well-groomed, well-developed  HEAD:  Atraumatic, Normocephalic  EYES: EOMI, PERRLA, conjunctiva and sclera clear  ENMT: No tonsillar erythema, exudates, or enlargement; Moist mucous membranes  NECK: Supple, No JVD, Normal thyroid  HEART: Regular rate and rhythm; No murmurs, rubs, or gallops  RESPIRATORY: CTA B/L, No W/R/R  ABDOMEN: Soft, Nontender, Nondistended; Bowel sounds present  NEUROLOGY: A&Ox3, nonfocal, moving all extremities  EXTREMITIES:  2+ Peripheral Pulses, No clubbing, cyanosis, or edema  SKIN: warm, dry, normal color, no rash or abnormal lesions PHYSICAL EXAM:  GENERAL: NAD, well-groomed, well-developed  HEAD:  Atraumatic, Normocephalic  EYES: EOMI, PERRLA, conjunctiva and sclera clear  ENMT: No tonsillar erythema, exudates, or enlargement; Moist mucous membranes  NECK: Supple, No JVD, Normal thyroid  HEART: Regular rate and rhythm; No murmurs, rubs, or gallops  RESPIRATORY: CTA B/L, No W/R/R  ABDOMEN: Soft, Tender LUQ, Nondistended; Bowel sounds present  NEUROLOGY: A&Ox3, nonfocal, moving all extremities  EXTREMITIES:  2+ Peripheral Pulses, No clubbing, cyanosis, or edema  SKIN: warm, dry, normal color, no rash or abnormal lesions

## 2023-04-15 NOTE — H&P ADULT - ASSESSMENT
72-year-old female with past medical history of breast cancer diagnosed in 2 weeks ago and [place] [facility] [place], perforated diverticulitis, kidney stone, appendectomy, and hysterectomy who presents with periumbilical abdominal pain.    #Abdominal Pain 72-year-old female with past medical history of breast cancer diagnosed in 2 weeks ago and [place] [facility] [place], perforated diverticulitis, kidney stone, appendectomy, and hysterectomy who presents with periumbilical abdominal pain.    #Abdominal Pain  #LUQ  - Unlikely pancreatitis, patient's lipase 63  - No sick contacts  - Last bowel movement yesterday  - CTA no intrabdominal pathology    Plan:  - Zofran PRN  - Carafate 3 times a day    #Leukocytosis  - Likely reactive/inflammatory  - Recent diagnosis of breast cancer  - Cystoscopy 1 week ago     Plan:  - Daily CBC  - Monitor off antibiotics     #Thickened Bladder wall seen on CT  - [place] [facility] [place]    Plan:  - Follow up with outpatient Urologist    #HTN  #HLD  - BP well controlled    Plan:  - c/w home Losartan 50mg  - c/w home HCT 12.5  - Lipid panel in AM  - c/w home atorvastatin 40mg      #Misc  - DVT Prophylaxis: Lovenox 40 daily  - GI Prophylaxis: c/w home Famotidine 20mg BID  - Diet: DASH  - Activity: As tolerated   - IV Fluids: None  - Code Status: Full     Dispo: Likely d/c in 24-48 hours

## 2023-04-16 ENCOUNTER — TRANSCRIPTION ENCOUNTER (OUTPATIENT)
Age: 73
End: 2023-04-16

## 2023-04-16 VITALS
DIASTOLIC BLOOD PRESSURE: 76 MMHG | SYSTOLIC BLOOD PRESSURE: 119 MMHG | HEART RATE: 77 BPM | TEMPERATURE: 98 F | RESPIRATION RATE: 20 BRPM

## 2023-04-16 LAB
ALBUMIN SERPL ELPH-MCNC: 3.9 G/DL — SIGNIFICANT CHANGE UP (ref 3.5–5.2)
ALP SERPL-CCNC: 127 U/L — HIGH (ref 30–115)
ALT FLD-CCNC: 48 U/L — HIGH (ref 0–41)
ANION GAP SERPL CALC-SCNC: 9 MMOL/L — SIGNIFICANT CHANGE UP (ref 7–14)
AST SERPL-CCNC: 31 U/L — SIGNIFICANT CHANGE UP (ref 0–41)
BASOPHILS # BLD AUTO: 0.02 K/UL — SIGNIFICANT CHANGE UP (ref 0–0.2)
BASOPHILS NFR BLD AUTO: 0.2 % — SIGNIFICANT CHANGE UP (ref 0–1)
BILIRUB SERPL-MCNC: 0.4 MG/DL — SIGNIFICANT CHANGE UP (ref 0.2–1.2)
BUN SERPL-MCNC: 13 MG/DL — SIGNIFICANT CHANGE UP (ref 10–20)
CALCIUM SERPL-MCNC: 9.2 MG/DL — SIGNIFICANT CHANGE UP (ref 8.4–10.5)
CHLORIDE SERPL-SCNC: 104 MMOL/L — SIGNIFICANT CHANGE UP (ref 98–110)
CO2 SERPL-SCNC: 28 MMOL/L — SIGNIFICANT CHANGE UP (ref 17–32)
CREAT SERPL-MCNC: 0.6 MG/DL — LOW (ref 0.7–1.5)
CULTURE RESULTS: SIGNIFICANT CHANGE UP
EGFR: 95 ML/MIN/1.73M2 — SIGNIFICANT CHANGE UP
EOSINOPHIL # BLD AUTO: 0.22 K/UL — SIGNIFICANT CHANGE UP (ref 0–0.7)
EOSINOPHIL NFR BLD AUTO: 2.1 % — SIGNIFICANT CHANGE UP (ref 0–8)
GLUCOSE SERPL-MCNC: 105 MG/DL — HIGH (ref 70–99)
HCT VFR BLD CALC: 39.5 % — SIGNIFICANT CHANGE UP (ref 37–47)
HGB BLD-MCNC: 13.2 G/DL — SIGNIFICANT CHANGE UP (ref 12–16)
IMM GRANULOCYTES NFR BLD AUTO: 0.5 % — HIGH (ref 0.1–0.3)
LYMPHOCYTES # BLD AUTO: 2.94 K/UL — SIGNIFICANT CHANGE UP (ref 1.2–3.4)
LYMPHOCYTES # BLD AUTO: 28.7 % — SIGNIFICANT CHANGE UP (ref 20.5–51.1)
MAGNESIUM SERPL-MCNC: 2 MG/DL — SIGNIFICANT CHANGE UP (ref 1.8–2.4)
MCHC RBC-ENTMCNC: 30.5 PG — SIGNIFICANT CHANGE UP (ref 27–31)
MCHC RBC-ENTMCNC: 33.4 G/DL — SIGNIFICANT CHANGE UP (ref 32–37)
MCV RBC AUTO: 91.2 FL — SIGNIFICANT CHANGE UP (ref 81–99)
MONOCYTES # BLD AUTO: 0.65 K/UL — HIGH (ref 0.1–0.6)
MONOCYTES NFR BLD AUTO: 6.3 % — SIGNIFICANT CHANGE UP (ref 1.7–9.3)
NEUTROPHILS # BLD AUTO: 6.36 K/UL — SIGNIFICANT CHANGE UP (ref 1.4–6.5)
NEUTROPHILS NFR BLD AUTO: 62.2 % — SIGNIFICANT CHANGE UP (ref 42.2–75.2)
NRBC # BLD: 0 /100 WBCS — SIGNIFICANT CHANGE UP (ref 0–0)
PLATELET # BLD AUTO: 209 K/UL — SIGNIFICANT CHANGE UP (ref 130–400)
PMV BLD: 10.5 FL — HIGH (ref 7.4–10.4)
POTASSIUM SERPL-MCNC: 4.2 MMOL/L — SIGNIFICANT CHANGE UP (ref 3.5–5)
POTASSIUM SERPL-SCNC: 4.2 MMOL/L — SIGNIFICANT CHANGE UP (ref 3.5–5)
PROT SERPL-MCNC: 5.9 G/DL — LOW (ref 6–8)
RBC # BLD: 4.33 M/UL — SIGNIFICANT CHANGE UP (ref 4.2–5.4)
RBC # FLD: 12.7 % — SIGNIFICANT CHANGE UP (ref 11.5–14.5)
SODIUM SERPL-SCNC: 141 MMOL/L — SIGNIFICANT CHANGE UP (ref 135–146)
SPECIMEN SOURCE: SIGNIFICANT CHANGE UP
WBC # BLD: 10.24 K/UL — SIGNIFICANT CHANGE UP (ref 4.8–10.8)
WBC # FLD AUTO: 10.24 K/UL — SIGNIFICANT CHANGE UP (ref 4.8–10.8)

## 2023-04-16 PROCEDURE — 99239 HOSP IP/OBS DSCHRG MGMT >30: CPT

## 2023-04-16 RX ORDER — PANTOPRAZOLE SODIUM 20 MG/1
1 TABLET, DELAYED RELEASE ORAL
Qty: 60 | Refills: 0
Start: 2023-04-16 | End: 2023-05-15

## 2023-04-16 RX ADMIN — Medication 1 TABLET(S): at 12:32

## 2023-04-16 RX ADMIN — FAMOTIDINE 20 MILLIGRAM(S): 10 INJECTION INTRAVENOUS at 05:58

## 2023-04-16 RX ADMIN — PANTOPRAZOLE SODIUM 40 MILLIGRAM(S): 20 TABLET, DELAYED RELEASE ORAL at 05:58

## 2023-04-16 NOTE — PHYSICAL THERAPY INITIAL EVALUATION ADULT - PATIENT/FAMILY/SIGNIFICANT OTHER GOALS STATEMENT, PT EVAL
Return home today, as she has appt scheduled at [facility] [place] at 9am tomorrow.

## 2023-04-16 NOTE — DISCHARGE NOTE NURSING/CASE MANAGEMENT/SOCIAL WORK - NSDCPEFALRISK_GEN_ALL_CORE
ED Attending Physician Note      Patient : John Aviles Age: 65 year old Sex: male   MRN: [id number] Encounter Date: 12/8/2021      History         Chief Complaint   Patient presents with   • Facial Droop   • Weakness, Focal Motor   • Slurred Speech         HPI: 65 year old male presents with right-sided weakness since Monday morning.  This 65-year-old male states that he has a history of hypertension only.  He states that he woke up on Monday morning with some right-sided weakness.  He notices particularly problems with fine motor movements such as typing.  He denies any aphasia but states that he has difficulty speaking which seems to be because his tongue and lips seem weaker than usual.  He has noticed weakness in his right arm and his leg.  He denies any falls, trauma, headache, chest pain or any other pain.  He denies any change in vision but states that he has had eye problems since he got the shingles shot years ago    Not on File    No past medical history on file.  Hypertension    No past surgical history on file.    No family history on file.    Social History     Tobacco Use   • Smoking status: Not on file   • Smokeless tobacco: Not on file   Substance Use Topics   • Alcohol use: Not on file   • Drug use: Not on file       Review of Systems   Constitutional: Negative for diaphoresis.   Eyes: Negative for visual disturbance.   Respiratory: Negative for shortness of breath.    Cardiovascular: Negative for chest pain.   Gastrointestinal: Negative for abdominal pain.   Skin: Negative for color change.   Allergic/Immunologic: Negative for immunocompromised state.   Neurological: Positive for speech difficulty and weakness. Negative for syncope, light-headedness, numbness and headaches.   All other systems reviewed and are negative.        Physical Exam     ED Triage Vitals [12/08/21 1120]   ED Triage Vitals Group      Temp 98.4 °F (36.9 °C)      Heart Rate (!) 112      Resp 18      BP (!) 159/100      SpO2 97  %      EtCO2 mmHg       Height 5' 4\" (1.626 m)      Weight 143 lb 4.8 oz (65 kg)      Weight Scale Used ED Stated      BMI (Calculated) 24.6      IBW/kg (Calculated) 59.2       Physical Exam  Vitals and nursing note reviewed.   Constitutional:       General: He is not in acute distress.     Appearance: He is not toxic-appearing or diaphoretic.   HENT:      Head: Normocephalic and atraumatic.   Eyes:      Extraocular Movements: Extraocular movements intact.      Pupils: Pupils are equal, round, and reactive to light.   Cardiovascular:      Rate and Rhythm: Normal rate and regular rhythm.   Pulmonary:      Effort: Pulmonary effort is normal. No respiratory distress.      Breath sounds: No stridor.   Abdominal:      General: There is no distension.      Palpations: Abdomen is soft. There is no mass.      Tenderness: There is no guarding or rebound.   Musculoskeletal:         General: No deformity.   Skin:     General: Skin is warm.      Capillary Refill: Capillary refill takes less than 2 seconds.      Coloration: Skin is not jaundiced.      Findings: No rash.   Neurological:      Mental Status: He is alert and oriented to person, place, and time.      Sensory: No sensory deficit.      Comments: Right face, right arm, and right leg all are demonstrably weaker than the left.   strength, elbow flexion, elbow extension, knee flexion, knee extension, foot extension and flexion are all 4+ out of 5 on the right compared to 5 out of 5 on the left, able to overcome my resistance.  When he puffs out his cheeks, he loses air from his right side.  Extraocular movements seem full but gaze seems disconjugate.  Patient states that this is present since his zoster vaccination.   Psychiatric:         Behavior: Behavior normal.     :    ED Course   INITIAL ASSESSMENT: Given the weakness extensively on the right side without any aphasia, I am concerned about a focal infarct of the left internal capsule or below such as the brainstem  or (right) spinal cord.  We will start with CT scan of the brain.  Differential diagnosis includes mass or less likely bleeding.    EKG: Sinus tachycardia heart rate 102 without ST segment elevation or depression, narrow QRS complex, no peaked T waves.      Lab Results     Results for orders placed or performed during the hospital encounter of 12/08/21   Comprehensive Metabolic Panel   Result Value Ref Range    Fasting Status      Sodium 134 (L) 135 - 145 mmol/L    Potassium 4.1 3.4 - 5.1 mmol/L    Chloride 102 98 - 107 mmol/L    Carbon Dioxide 25 21 - 32 mmol/L    Anion Gap 11 10 - 20 mmol/L    Glucose 338 (H) 70 - 99 mg/dL    BUN 14 6 - 20 mg/dL    Creatinine 0.82 0.67 - 1.17 mg/dL    Glomerular Filtration Rate >90 >=60    BUN/ Creatinine Ratio 17 7 - 25    Calcium 9.3 8.4 - 10.2 mg/dL    Bilirubin, Total 1.7 (H) 0.2 - 1.0 mg/dL    GOT/AST 23 <=37 Units/L    GPT/ALT 43 <64 Units/L    Alkaline Phosphatase 107 45 - 117 Units/L    Albumin 4.0 3.6 - 5.1 g/dL    Protein, Total 8.2 6.4 - 8.2 g/dL    Globulin 4.2 (H) 2.0 - 4.0 g/dL    A/G Ratio 1.0 1.0 - 2.4   TROPONIN I, HIGH SENSITIVITY   Result Value Ref Range    Troponin I, High Sensitivity 5 <77 ng/L   CBC with Automated Differential (performable only)   Result Value Ref Range    WBC 10.7 4.2 - 11.0 K/mcL    RBC 5.70 4.50 - 5.90 mil/mcL    HGB 17.3 (H) 13.0 - 17.0 g/dL    HCT 48.2 39.0 - 51.0 %    MCV 84.6 78.0 - 100.0 fl    MCH 30.4 26.0 - 34.0 pg    MCHC 35.9 32.0 - 36.5 g/dL    RDW-CV 12.9 11.0 - 15.0 %    RDW-SD 39.4 39.0 - 50.0 fL     140 - 450 K/mcL    NRBC 0 <=0 /100 WBC    Neutrophil, Percent 77 %    Lymphocytes, Percent 15 %    Mono, Percent 5 %    Eosinophils, Percent 1 %    Basophils, Percent 1 %    Immature Granulocytes 1 %    Absolute Neutrophils 8.4 (H) 1.8 - 7.7 K/mcL    Absolute Lymphocytes 1.6 1.0 - 4.0 K/mcL    Absolute Monocytes 0.5 0.3 - 0.9 K/mcL    Absolute Eosinophils  0.1 0.0 - 0.5 K/mcL    Absolute Basophils 0.1 0.0 - 0.3 K/mcL     Absolute Immmature Granulocytes 0.1 0.0 - 0.2 K/mcL   Rapid SARS-CoV-2 by PCR    Specimen: Nasal, Mid-turbinate; Swab   Result Value Ref Range    Rapid SARS-COV-2 by PCR Not Detected Not Detected / Detected / Presumptive Positive / Inhibitors present    Isolation Guidelines      Procedural Comment           Radiology Results     Imaging Results          CT HEAD WO CONTRAST (Final result)  Result time 12/08/21 13:17:31    Final result                 Impression:    1. Ovoid hypodensity in the central left edwardo, concerning for lacunar  infarct.  2.  No acute intracranial bleed or calvarial fracture.    Findings were notified to Dr. Galaviz, ordering clinician at 12/8/2021  1:17 PM, by telephone.    Electronically Signed by: DEMETRIUS SALDAÑA M.D.   Signed on: 12/8/2021 1:17 PM                Narrative:    CT HEAD WITHOUT CONTRAST    HISTORY: Weakness, slurred speech    COMPARISON: None.    TECHNIQUE: CT scan of the head is obtained without  IV contrast.      One or more of the following radiation dose reduction techniques were used  for this examination: Exposure control, adjustment of the mA and/or kV  according to patient size, or use of iterative reconstruction technique.    FINDINGS:    No mass effect or midline shift is noted. No abnormal extra-axial fluid  collections are noted. Left central edwardo ovoid hypodensity may represent a  lacunar infarct measuring about 8 mm in diameter. Mild periventricular  white matter hypodensities may represent small vessel ischemic changes. CT  without contrast may be insensitive for detection of subtle mass lesion or  early acute ischemic infarct.    Bony calvarium is intact. Visualized paranasal sinuses and mastoid air  cells are patent and aerated.                               XR CHEST PA AND LATERAL 2 VIEWS (Final result)  Result time 12/08/21 12:25:28    Final result                 Impression:       No acute cardiopulmonary process.        Electronically Signed by: SONJA MOJICA  MD SCOTT   Signed on: 12/8/2021 12:25 PM                Narrative:    EXAM: XR CHEST PA AND LATERAL 2 VIEWS    CLINICAL INDICATION: Chest pain    COMPARISON: None.    FINDINGS:     No support devices.    Cardiac and mediastinal contours are within normal limits for age.    No focal consolidation, significant pleural effusion or pneumothorax.    Osseous structures and upper abdomen are within normal limits for age.                                      MDM: Objective work-up is consistent with our initial concern of brainstem lesion.  I discussed with the patient who expressed understanding.  I would recommend admission to risk stratify cardiac or carotid emboli.  Patient will likely also require Physical therapy.    Patient is not a candidate for thrombectomy or TPA given the onset of symptoms since wake up on Monday morning, greater than 24 hours ago.    At approximately 1:40, the neurology team arrived in the emergency department to evaluate this patient.    Clinical Impression        ED Diagnosis   1. Left pontine stroke (CMS/HCC)         Disposition        Admit 12/8/2021  1:25 PM  Telemetry Bed?: Yes  Admitting Physician: EDDI BASS [[id number]]  Transferring Patient to? Only adjust for [facility] [[id number]]  Is this a telephone or verbal order?: This is a telephone order from the admitting physician                           Jose Elias Galaviz MD  12/08/21 2157     For information on Fall & Injury Prevention, visit: [email or web address] OR  [email or web address] OR  https://www.cdc.gov/steadi/patient.html

## 2023-04-16 NOTE — DISCHARGE NOTE PROVIDER - ATTENDING DISCHARGE PHYSICAL EXAMINATION:
T(C): 36.4 (04-16-23 @ 05:15), Max: 36.8 (04-15-23 @ 14:06)  HR: 77 (04-16-23 @ 05:15) (77 - 90)  BP: 115/57 (04-16-23 @ 05:15) (98/53 - 134/67)  RR: 18 (04-16-23 @ 05:15) (18 - 19)  SpO2: --    CONSTITUTIONAL: Well groomed  RESP: No respiratory distress, no use of accessory muscles; CTA b/l, no WRR  CV: RRR, +S1S2, no MRG; no JVD; no peripheral edema  GI: Soft, NT, ND, no rebound, no guarding; no palpable masses; no hepatosplenomegaly; no hernia palpated  MSK: No digital clubbing or cyanosis; examination of the (head/neck/spine/ribs/pelvis, RUE, LUE, RLE, LLE) without misalignment,            Normal ROM without pain, no spinal tenderness, normal muscle strength/tone  SKIN: No rashes or ulcers noted; no subcutaneous nodules or induration palpable  PSYCH: Appropriate insight/judgment; A+O x 3, mood and affect appropriate, recent/remote memory intact

## 2023-04-16 NOTE — DISCHARGE NOTE PROVIDER - HOSPITAL COURSE
Used a [employer] Interpretor: ID # [id number]    72-year-old female with past medical history of breast cancer diagnosed in 2 weeks ago and following with oncologist at [facility], hypertension, perforated diverticulitis, kidney stone, appendectomy, and hysterectomy who presents with abdominal pain for the last 5 days. Pt's abdominal pain is present in her epigastric  area and resolved with ppi. Pt denies any hypogastrium pain or bladder pain or issues with urinating. CTAP: Apparent new focal thickening at the base of the anterior bladder wall, cannot exclude neoplasm. Recommend cystoscopy for further evaluation. Recommend that she follow up with her urologist which she already has an appt. Recommend that she continue ppi and she has an appt with [facility] tomorrow. No other changes in her medications. Has no complaints today.

## 2023-04-16 NOTE — DISCHARGE NOTE PROVIDER - NSDCMRMEDTOKEN_GEN_ALL_CORE_FT
atorvastatin 40 mg oral tablet: 1 orally once a day (at bedtime)  famotidine 20 mg oral tablet: 1 orally 2 times a day  losartan-hydrochlorothiazide 50 mg-12.5 mg oral tablet: 1 orally once a day  montelukast 10 mg oral tablet: 1 orally once a day (at bedtime)  Multiple Vitamins oral tablet: 1 orally once a day  pantoprazole 40 mg oral delayed release tablet: 1 tab(s) orally every 12 hours MDD: 2  vitamin A 50,000 units oral capsule: 1 cap(s) orally once a week  Xanax 0.5 mg oral tablet: 1 orally once a day as needed for  anxiety

## 2023-04-16 NOTE — PHYSICAL THERAPY INITIAL EVALUATION ADULT - PERTINENT HX OF CURRENT PROBLEM, REHAB EVAL
Admitted for acute onset abd pain, of note was diagnosed with breast CA 2 weeks ago and is following at [facility].

## 2023-04-16 NOTE — PHYSICAL THERAPY INITIAL EVALUATION ADULT - SPECIFY REASON(S)
Pt. is independent and at baseline with functional mobility, PT not indicated in acute setting, rec. cont ambulation with nursing staff supervision.

## 2023-04-16 NOTE — PHYSICAL THERAPY INITIAL EVALUATION ADULT - ADDITIONAL COMMENTS
Pt. lives in 1/2 of private home with , son lives upstairs. Pt. is baseline independent without device, does not work or drive, 15 stairs with railing to enter home.

## 2023-04-16 NOTE — DISCHARGE NOTE PROVIDER - NSDCCPCAREPLAN_GEN_ALL_CORE_FT
PRINCIPAL DISCHARGE DIAGNOSIS  Diagnosis: Abdominal pain  Assessment and Plan of Treatment: Start pantoprazole 40mg twice a day 30 mins before breakfast and 30 mins before dinner.  There is a thickening of your bladder that needs to be evaluated by your urologist. Please attend your appt tomorrow at [facility]      SECONDARY DISCHARGE DIAGNOSES  Diagnosis: Intractable nausea and vomiting  Assessment and Plan of Treatment: Resolved; can resume regular diet

## 2023-04-16 NOTE — PHYSICAL THERAPY INITIAL EVALUATION ADULT - GENERAL OBSERVATIONS, REHAB EVAL
Chart reviewed. Pt. in bed upon PT arrival,  Nikko #[id number] utilized throughout. Pt. without c/o pain and agreeable immediately to PT hermanal, reporting she has been getting OOB since admission.

## 2023-04-16 NOTE — DISCHARGE NOTE NURSING/CASE MANAGEMENT/SOCIAL WORK - PATIENT PORTAL LINK FT
You can access the FollowMyHealth Patient Portal offered by [facility] by registering at the following website: [email or web address]. By joining [employer]’s FollowMyHealth portal, you will also be able to view your health information using other applications (apps) compatible with our system.

## 2023-04-20 DIAGNOSIS — C50.919 MALIGNANT NEOPLASM OF UNSPECIFIED SITE OF UNSPECIFIED FEMALE BREAST: ICD-10-CM

## 2023-04-20 DIAGNOSIS — R10.12 LEFT UPPER QUADRANT PAIN: ICD-10-CM

## 2023-04-20 DIAGNOSIS — N32.89 OTHER SPECIFIED DISORDERS OF BLADDER: ICD-10-CM

## 2023-04-20 DIAGNOSIS — Z20.822 CONTACT WITH AND (SUSPECTED) EXPOSURE TO COVID-19: ICD-10-CM

## 2023-04-20 DIAGNOSIS — N28.1 CYST OF KIDNEY, ACQUIRED: ICD-10-CM

## 2023-04-20 DIAGNOSIS — D72.829 ELEVATED WHITE BLOOD CELL COUNT, UNSPECIFIED: ICD-10-CM

## 2023-04-20 DIAGNOSIS — F17.210 NICOTINE DEPENDENCE, CIGARETTES, UNCOMPLICATED: ICD-10-CM

## 2023-04-20 DIAGNOSIS — R10.13 EPIGASTRIC PAIN: ICD-10-CM

## 2023-04-20 DIAGNOSIS — I10 ESSENTIAL (PRIMARY) HYPERTENSION: ICD-10-CM

## 2024-05-21 ENCOUNTER — APPOINTMENT (OUTPATIENT)
Dept: OBGYN | Facility: CLINIC | Age: 74
End: 2024-05-21

## 2024-09-03 NOTE — ED ADULT TRIAGE NOTE - SOURCE OF INFORMATION
Photo Preface (Leave Blank If You Do Not Want): Photographs were obtained today
Detail Level: Zone
Patient